# Patient Record
Sex: FEMALE | Race: WHITE | NOT HISPANIC OR LATINO | Employment: OTHER | URBAN - METROPOLITAN AREA
[De-identification: names, ages, dates, MRNs, and addresses within clinical notes are randomized per-mention and may not be internally consistent; named-entity substitution may affect disease eponyms.]

---

## 2022-12-14 ENCOUNTER — APPOINTMENT (OUTPATIENT)
Dept: GENERAL RADIOLOGY | Facility: HOSPITAL | Age: 87
End: 2022-12-14

## 2022-12-14 ENCOUNTER — HOSPITAL ENCOUNTER (OUTPATIENT)
Facility: HOSPITAL | Age: 87
Setting detail: OBSERVATION
Discharge: HOME | End: 2022-12-15
Attending: EMERGENCY MEDICINE | Admitting: INTERNAL MEDICINE

## 2022-12-14 DIAGNOSIS — R09.02 HYPOXIA: ICD-10-CM

## 2022-12-14 DIAGNOSIS — J18.9 MULTIFOCAL PNEUMONIA: Primary | ICD-10-CM

## 2022-12-14 DIAGNOSIS — J96.01 ACUTE RESPIRATORY FAILURE WITH HYPOXIA: ICD-10-CM

## 2022-12-14 PROBLEM — R65.10 SIRS (SYSTEMIC INFLAMMATORY RESPONSE SYNDROME): Status: ACTIVE | Noted: 2022-12-14

## 2022-12-14 LAB
ANION GAP SERPL CALCULATED.3IONS-SCNC: 13 MMOL/L (ref 5–15)
B PARAPERT DNA SPEC QL NAA+PROBE: NOT DETECTED
B PERT DNA SPEC QL NAA+PROBE: NOT DETECTED
BASOPHILS # BLD AUTO: 0 10*3/MM3 (ref 0–0.2)
BASOPHILS NFR BLD AUTO: 0.3 % (ref 0–1.5)
BUN SERPL-MCNC: 15 MG/DL (ref 8–23)
BUN/CREAT SERPL: 16.1 (ref 7–25)
C PNEUM DNA NPH QL NAA+NON-PROBE: NOT DETECTED
CALCIUM SPEC-SCNC: 9.6 MG/DL (ref 8.6–10.5)
CHLORIDE SERPL-SCNC: 102 MMOL/L (ref 98–107)
CO2 SERPL-SCNC: 24 MMOL/L (ref 22–29)
CREAT SERPL-MCNC: 0.93 MG/DL (ref 0.57–1)
D-LACTATE SERPL-SCNC: 0.3 MMOL/L (ref 0.5–2)
D-LACTATE SERPL-SCNC: 0.4 MMOL/L (ref 0.5–2)
DEPRECATED RDW RBC AUTO: 41.6 FL (ref 37–54)
EGFRCR SERPLBLD CKD-EPI 2021: 58.9 ML/MIN/1.73
EOSINOPHIL # BLD AUTO: 0 10*3/MM3 (ref 0–0.4)
EOSINOPHIL NFR BLD AUTO: 0.1 % (ref 0.3–6.2)
ERYTHROCYTE [DISTWIDTH] IN BLOOD BY AUTOMATED COUNT: 13.2 % (ref 12.3–15.4)
FLUAV SUBTYP SPEC NAA+PROBE: NOT DETECTED
FLUBV RNA ISLT QL NAA+PROBE: NOT DETECTED
GLUCOSE SERPL-MCNC: 106 MG/DL (ref 65–99)
HADV DNA SPEC NAA+PROBE: NOT DETECTED
HCOV 229E RNA SPEC QL NAA+PROBE: NOT DETECTED
HCOV HKU1 RNA SPEC QL NAA+PROBE: NOT DETECTED
HCOV NL63 RNA SPEC QL NAA+PROBE: NOT DETECTED
HCOV OC43 RNA SPEC QL NAA+PROBE: NOT DETECTED
HCT VFR BLD AUTO: 37.4 % (ref 34–46.6)
HGB BLD-MCNC: 12 G/DL (ref 12–15.9)
HMPV RNA NPH QL NAA+NON-PROBE: NOT DETECTED
HPIV1 RNA ISLT QL NAA+PROBE: NOT DETECTED
HPIV2 RNA SPEC QL NAA+PROBE: NOT DETECTED
HPIV3 RNA NPH QL NAA+PROBE: NOT DETECTED
HPIV4 P GENE NPH QL NAA+PROBE: NOT DETECTED
LYMPHOCYTES # BLD AUTO: 1.6 10*3/MM3 (ref 0.7–3.1)
LYMPHOCYTES NFR BLD AUTO: 10.5 % (ref 19.6–45.3)
M PNEUMO IGG SER IA-ACNC: NOT DETECTED
MCH RBC QN AUTO: 29.1 PG (ref 26.6–33)
MCHC RBC AUTO-ENTMCNC: 32 G/DL (ref 31.5–35.7)
MCV RBC AUTO: 91.1 FL (ref 79–97)
MONOCYTES # BLD AUTO: 1.3 10*3/MM3 (ref 0.1–0.9)
MONOCYTES NFR BLD AUTO: 8.8 % (ref 5–12)
NEUTROPHILS NFR BLD AUTO: 12 10*3/MM3 (ref 1.7–7)
NEUTROPHILS NFR BLD AUTO: 80.3 % (ref 42.7–76)
NRBC BLD AUTO-RTO: 0 /100 WBC (ref 0–0.2)
PLATELET # BLD AUTO: 317 10*3/MM3 (ref 140–450)
PMV BLD AUTO: 7.2 FL (ref 6–12)
POTASSIUM SERPL-SCNC: 3.9 MMOL/L (ref 3.5–5.2)
RBC # BLD AUTO: 4.11 10*6/MM3 (ref 3.77–5.28)
RHINOVIRUS RNA SPEC NAA+PROBE: NOT DETECTED
RSV RNA NPH QL NAA+NON-PROBE: NOT DETECTED
SARS-COV-2 RNA NPH QL NAA+NON-PROBE: NOT DETECTED
SODIUM SERPL-SCNC: 139 MMOL/L (ref 136–145)
WBC NRBC COR # BLD: 14.9 10*3/MM3 (ref 3.4–10.8)

## 2022-12-14 PROCEDURE — 93005 ELECTROCARDIOGRAM TRACING: CPT

## 2022-12-14 PROCEDURE — 84443 ASSAY THYROID STIM HORMONE: CPT | Performed by: NURSE PRACTITIONER

## 2022-12-14 PROCEDURE — 84145 PROCALCITONIN (PCT): CPT | Performed by: NURSE PRACTITIONER

## 2022-12-14 PROCEDURE — 86140 C-REACTIVE PROTEIN: CPT | Performed by: NURSE PRACTITIONER

## 2022-12-14 PROCEDURE — 80048 BASIC METABOLIC PNL TOTAL CA: CPT | Performed by: EMERGENCY MEDICINE

## 2022-12-14 PROCEDURE — 99284 EMERGENCY DEPT VISIT MOD MDM: CPT

## 2022-12-14 PROCEDURE — 71045 X-RAY EXAM CHEST 1 VIEW: CPT

## 2022-12-14 PROCEDURE — 83605 ASSAY OF LACTIC ACID: CPT

## 2022-12-14 PROCEDURE — 83735 ASSAY OF MAGNESIUM: CPT | Performed by: NURSE PRACTITIONER

## 2022-12-14 PROCEDURE — 80061 LIPID PANEL: CPT | Performed by: NURSE PRACTITIONER

## 2022-12-14 PROCEDURE — 87040 BLOOD CULTURE FOR BACTERIA: CPT | Performed by: EMERGENCY MEDICINE

## 2022-12-14 PROCEDURE — 0202U NFCT DS 22 TRGT SARS-COV-2: CPT | Performed by: EMERGENCY MEDICINE

## 2022-12-14 PROCEDURE — 93005 ELECTROCARDIOGRAM TRACING: CPT | Performed by: EMERGENCY MEDICINE

## 2022-12-14 PROCEDURE — 85025 COMPLETE CBC W/AUTO DIFF WBC: CPT | Performed by: EMERGENCY MEDICINE

## 2022-12-14 RX ORDER — IPRATROPIUM BROMIDE AND ALBUTEROL SULFATE 2.5; .5 MG/3ML; MG/3ML
3 SOLUTION RESPIRATORY (INHALATION) EVERY 6 HOURS PRN
Status: DISCONTINUED | OUTPATIENT
Start: 2022-12-14 | End: 2022-12-16 | Stop reason: HOSPADM

## 2022-12-14 RX ORDER — ONDANSETRON 2 MG/ML
4 INJECTION INTRAMUSCULAR; INTRAVENOUS EVERY 6 HOURS PRN
Status: DISCONTINUED | OUTPATIENT
Start: 2022-12-14 | End: 2022-12-16 | Stop reason: HOSPADM

## 2022-12-14 RX ORDER — ACETAMINOPHEN 325 MG/1
650 TABLET ORAL EVERY 4 HOURS PRN
Status: DISCONTINUED | OUTPATIENT
Start: 2022-12-14 | End: 2022-12-16 | Stop reason: HOSPADM

## 2022-12-14 RX ORDER — METHYLPREDNISOLONE SODIUM SUCCINATE 40 MG/ML
40 INJECTION, POWDER, LYOPHILIZED, FOR SOLUTION INTRAMUSCULAR; INTRAVENOUS DAILY
Status: DISCONTINUED | OUTPATIENT
Start: 2022-12-15 | End: 2022-12-15

## 2022-12-14 RX ORDER — AMOXICILLIN 250 MG
1 CAPSULE ORAL NIGHTLY PRN
Status: DISCONTINUED | OUTPATIENT
Start: 2022-12-14 | End: 2022-12-16 | Stop reason: HOSPADM

## 2022-12-14 RX ORDER — SODIUM CHLORIDE 0.9 % (FLUSH) 0.9 %
10 SYRINGE (ML) INJECTION AS NEEDED
Status: DISCONTINUED | OUTPATIENT
Start: 2022-12-14 | End: 2022-12-16 | Stop reason: HOSPADM

## 2022-12-14 RX ORDER — CHOLECALCIFEROL (VITAMIN D3) 125 MCG
5 CAPSULE ORAL NIGHTLY PRN
Status: DISCONTINUED | OUTPATIENT
Start: 2022-12-14 | End: 2022-12-16 | Stop reason: HOSPADM

## 2022-12-14 RX ORDER — POTASSIUM CHLORIDE 20 MEQ/1
40 TABLET, EXTENDED RELEASE ORAL AS NEEDED
Status: DISCONTINUED | OUTPATIENT
Start: 2022-12-14 | End: 2022-12-16 | Stop reason: HOSPADM

## 2022-12-14 RX ORDER — POTASSIUM CHLORIDE 1.5 G/1.77G
40 POWDER, FOR SOLUTION ORAL AS NEEDED
Status: DISCONTINUED | OUTPATIENT
Start: 2022-12-14 | End: 2022-12-16 | Stop reason: HOSPADM

## 2022-12-14 RX ORDER — MAGNESIUM SULFATE 1 G/100ML
1 INJECTION INTRAVENOUS AS NEEDED
Status: DISCONTINUED | OUTPATIENT
Start: 2022-12-14 | End: 2022-12-16 | Stop reason: HOSPADM

## 2022-12-14 RX ORDER — SODIUM CHLORIDE 0.9 % (FLUSH) 0.9 %
10 SYRINGE (ML) INJECTION EVERY 12 HOURS SCHEDULED
Status: DISCONTINUED | OUTPATIENT
Start: 2022-12-14 | End: 2022-12-16 | Stop reason: HOSPADM

## 2022-12-14 RX ORDER — ONDANSETRON 4 MG/1
4 TABLET, FILM COATED ORAL EVERY 6 HOURS PRN
Status: DISCONTINUED | OUTPATIENT
Start: 2022-12-14 | End: 2022-12-16 | Stop reason: HOSPADM

## 2022-12-14 RX ORDER — ACETAMINOPHEN 650 MG/1
650 SUPPOSITORY RECTAL EVERY 4 HOURS PRN
Status: DISCONTINUED | OUTPATIENT
Start: 2022-12-14 | End: 2022-12-16 | Stop reason: HOSPADM

## 2022-12-14 RX ORDER — BENZONATATE 100 MG/1
100 CAPSULE ORAL 3 TIMES DAILY PRN
Status: DISCONTINUED | OUTPATIENT
Start: 2022-12-14 | End: 2022-12-16 | Stop reason: HOSPADM

## 2022-12-14 RX ORDER — GUAIFENESIN 600 MG/1
600 TABLET, EXTENDED RELEASE ORAL EVERY 12 HOURS SCHEDULED
Status: DISCONTINUED | OUTPATIENT
Start: 2022-12-14 | End: 2022-12-16 | Stop reason: HOSPADM

## 2022-12-14 RX ORDER — MAGNESIUM SULFATE HEPTAHYDRATE 40 MG/ML
2 INJECTION, SOLUTION INTRAVENOUS AS NEEDED
Status: DISCONTINUED | OUTPATIENT
Start: 2022-12-14 | End: 2022-12-16 | Stop reason: HOSPADM

## 2022-12-14 RX ORDER — NITROGLYCERIN 0.4 MG/1
0.4 TABLET SUBLINGUAL
Status: DISCONTINUED | OUTPATIENT
Start: 2022-12-14 | End: 2022-12-16 | Stop reason: HOSPADM

## 2022-12-14 RX ORDER — ACETAMINOPHEN 160 MG/5ML
650 SOLUTION ORAL EVERY 4 HOURS PRN
Status: DISCONTINUED | OUTPATIENT
Start: 2022-12-14 | End: 2022-12-16 | Stop reason: HOSPADM

## 2022-12-14 RX ORDER — SODIUM CHLORIDE 9 MG/ML
40 INJECTION, SOLUTION INTRAVENOUS AS NEEDED
Status: DISCONTINUED | OUTPATIENT
Start: 2022-12-14 | End: 2022-12-16 | Stop reason: HOSPADM

## 2022-12-14 RX ORDER — CALCIUM CARBONATE 200(500)MG
2 TABLET,CHEWABLE ORAL 2 TIMES DAILY PRN
Status: DISCONTINUED | OUTPATIENT
Start: 2022-12-14 | End: 2022-12-16 | Stop reason: HOSPADM

## 2022-12-15 ENCOUNTER — APPOINTMENT (OUTPATIENT)
Dept: CT IMAGING | Facility: HOSPITAL | Age: 87
End: 2022-12-15

## 2022-12-15 VITALS
BODY MASS INDEX: 27.96 KG/M2 | TEMPERATURE: 98.6 F | DIASTOLIC BLOOD PRESSURE: 47 MMHG | HEART RATE: 63 BPM | OXYGEN SATURATION: 93 % | HEIGHT: 59 IN | RESPIRATION RATE: 14 BRPM | SYSTOLIC BLOOD PRESSURE: 118 MMHG | WEIGHT: 138.67 LBS

## 2022-12-15 PROBLEM — G47.00 INSOMNIA: Status: ACTIVE | Noted: 2022-12-15

## 2022-12-15 PROBLEM — J96.01 ACUTE RESPIRATORY FAILURE WITH HYPOXIA: Status: ACTIVE | Noted: 2022-12-15

## 2022-12-15 LAB
ANION GAP SERPL CALCULATED.3IONS-SCNC: 8 MMOL/L (ref 5–15)
BASOPHILS # BLD AUTO: 0 10*3/MM3 (ref 0–0.2)
BASOPHILS NFR BLD AUTO: 0.2 % (ref 0–1.5)
BUN SERPL-MCNC: 16 MG/DL (ref 8–23)
BUN/CREAT SERPL: 16.5 (ref 7–25)
CALCIUM SPEC-SCNC: 9.8 MG/DL (ref 8.6–10.5)
CHLORIDE SERPL-SCNC: 105 MMOL/L (ref 98–107)
CHOLEST SERPL-MCNC: 203 MG/DL (ref 0–200)
CO2 SERPL-SCNC: 25 MMOL/L (ref 22–29)
CREAT SERPL-MCNC: 0.97 MG/DL (ref 0.57–1)
CRP SERPL-MCNC: 14.49 MG/DL (ref 0–0.5)
DEPRECATED RDW RBC AUTO: 42.9 FL (ref 37–54)
EGFRCR SERPLBLD CKD-EPI 2021: 56 ML/MIN/1.73
EOSINOPHIL # BLD AUTO: 0 10*3/MM3 (ref 0–0.4)
EOSINOPHIL NFR BLD AUTO: 0 % (ref 0.3–6.2)
ERYTHROCYTE [DISTWIDTH] IN BLOOD BY AUTOMATED COUNT: 12.9 % (ref 12.3–15.4)
GLUCOSE SERPL-MCNC: 155 MG/DL (ref 65–99)
HBA1C MFR BLD: 4.7 % (ref 3.5–5.6)
HCT VFR BLD AUTO: 35.6 % (ref 34–46.6)
HDLC SERPL-MCNC: 69 MG/DL (ref 40–60)
HGB BLD-MCNC: 12 G/DL (ref 12–15.9)
LDLC SERPL CALC-MCNC: 121 MG/DL (ref 0–100)
LDLC/HDLC SERPL: 1.73 {RATIO}
LYMPHOCYTES # BLD AUTO: 0.9 10*3/MM3 (ref 0.7–3.1)
LYMPHOCYTES NFR BLD AUTO: 6 % (ref 19.6–45.3)
MAGNESIUM SERPL-MCNC: 2 MG/DL (ref 1.6–2.4)
MAGNESIUM SERPL-MCNC: 2.2 MG/DL (ref 1.6–2.4)
MCH RBC QN AUTO: 30.2 PG (ref 26.6–33)
MCHC RBC AUTO-ENTMCNC: 33.7 G/DL (ref 31.5–35.7)
MCV RBC AUTO: 89.5 FL (ref 79–97)
MONOCYTES # BLD AUTO: 0.5 10*3/MM3 (ref 0.1–0.9)
MONOCYTES NFR BLD AUTO: 3.2 % (ref 5–12)
NEUTROPHILS NFR BLD AUTO: 13 10*3/MM3 (ref 1.7–7)
NEUTROPHILS NFR BLD AUTO: 90.6 % (ref 42.7–76)
NRBC BLD AUTO-RTO: 0 /100 WBC (ref 0–0.2)
PLATELET # BLD AUTO: 292 10*3/MM3 (ref 140–450)
PMV BLD AUTO: 7.3 FL (ref 6–12)
POTASSIUM SERPL-SCNC: 5 MMOL/L (ref 3.5–5.2)
PROCALCITONIN SERPL-MCNC: 0.1 NG/ML (ref 0–0.25)
RBC # BLD AUTO: 3.98 10*6/MM3 (ref 3.77–5.28)
SODIUM SERPL-SCNC: 138 MMOL/L (ref 136–145)
TRIGL SERPL-MCNC: 74 MG/DL (ref 0–150)
TSH SERPL DL<=0.05 MIU/L-ACNC: 1.06 UIU/ML (ref 0.27–4.2)
VLDLC SERPL-MCNC: 13 MG/DL (ref 5–40)
WBC NRBC COR # BLD: 14.4 10*3/MM3 (ref 3.4–10.8)

## 2022-12-15 PROCEDURE — 94618 PULMONARY STRESS TESTING: CPT

## 2022-12-15 PROCEDURE — 83735 ASSAY OF MAGNESIUM: CPT | Performed by: NURSE PRACTITIONER

## 2022-12-15 PROCEDURE — 96365 THER/PROPH/DIAG IV INF INIT: CPT

## 2022-12-15 PROCEDURE — G0378 HOSPITAL OBSERVATION PER HR: HCPCS

## 2022-12-15 PROCEDURE — 94799 UNLISTED PULMONARY SVC/PX: CPT

## 2022-12-15 PROCEDURE — 83036 HEMOGLOBIN GLYCOSYLATED A1C: CPT | Performed by: NURSE PRACTITIONER

## 2022-12-15 PROCEDURE — 94640 AIRWAY INHALATION TREATMENT: CPT

## 2022-12-15 PROCEDURE — 36415 COLL VENOUS BLD VENIPUNCTURE: CPT | Performed by: NURSE PRACTITIONER

## 2022-12-15 PROCEDURE — 80048 BASIC METABOLIC PNL TOTAL CA: CPT | Performed by: NURSE PRACTITIONER

## 2022-12-15 PROCEDURE — 94664 DEMO&/EVAL PT USE INHALER: CPT

## 2022-12-15 PROCEDURE — 94761 N-INVAS EAR/PLS OXIMETRY MLT: CPT

## 2022-12-15 PROCEDURE — 96367 TX/PROPH/DG ADDL SEQ IV INF: CPT

## 2022-12-15 PROCEDURE — 96375 TX/PRO/DX INJ NEW DRUG ADDON: CPT

## 2022-12-15 PROCEDURE — 85025 COMPLETE CBC W/AUTO DIFF WBC: CPT | Performed by: NURSE PRACTITIONER

## 2022-12-15 PROCEDURE — 71250 CT THORAX DX C-: CPT

## 2022-12-15 PROCEDURE — 25010000002 CEFTRIAXONE PER 250 MG: Performed by: EMERGENCY MEDICINE

## 2022-12-15 PROCEDURE — 25010000002 METHYLPREDNISOLONE PER 40 MG: Performed by: NURSE PRACTITIONER

## 2022-12-15 RX ORDER — BENZONATATE 100 MG/1
100 CAPSULE ORAL 3 TIMES DAILY PRN
Qty: 30 CAPSULE | Refills: 0 | Status: SHIPPED | OUTPATIENT
Start: 2022-12-15 | End: 2022-12-25

## 2022-12-15 RX ORDER — PANTOPRAZOLE SODIUM 40 MG/1
40 TABLET, DELAYED RELEASE ORAL DAILY
Status: DISCONTINUED | OUTPATIENT
Start: 2022-12-15 | End: 2022-12-16 | Stop reason: HOSPADM

## 2022-12-15 RX ORDER — PREDNISONE 10 MG/1
30 TABLET ORAL DAILY
Qty: 6 TABLET | Refills: 0 | Status: SHIPPED | OUTPATIENT
Start: 2022-12-16 | End: 2022-12-15

## 2022-12-15 RX ORDER — DOXYCYCLINE 100 MG/1
100 TABLET ORAL EVERY 12 HOURS SCHEDULED
Qty: 13 TABLET | Refills: 0 | Status: SHIPPED | OUTPATIENT
Start: 2022-12-15 | End: 2022-12-22

## 2022-12-15 RX ORDER — TRAMADOL HYDROCHLORIDE 50 MG/1
50 TABLET ORAL EVERY 6 HOURS PRN
Status: DISCONTINUED | OUTPATIENT
Start: 2022-12-15 | End: 2022-12-16 | Stop reason: HOSPADM

## 2022-12-15 RX ORDER — PREDNISONE 10 MG/1
10 TABLET ORAL DAILY
Qty: 12 TABLET | Refills: 0 | Status: SHIPPED | OUTPATIENT
Start: 2022-12-20 | End: 2022-12-22

## 2022-12-15 RX ORDER — PANTOPRAZOLE SODIUM 40 MG/1
40 TABLET, DELAYED RELEASE ORAL DAILY
COMMUNITY

## 2022-12-15 RX ORDER — CEFDINIR 300 MG/1
300 CAPSULE ORAL EVERY 12 HOURS SCHEDULED
Qty: 13 CAPSULE | Refills: 0 | Status: SHIPPED | OUTPATIENT
Start: 2022-12-15 | End: 2022-12-15 | Stop reason: SDUPTHER

## 2022-12-15 RX ORDER — DOXYCYCLINE 100 MG/1
100 TABLET ORAL EVERY 12 HOURS SCHEDULED
Status: DISCONTINUED | OUTPATIENT
Start: 2022-12-15 | End: 2022-12-16 | Stop reason: HOSPADM

## 2022-12-15 RX ORDER — CEFDINIR 300 MG/1
300 CAPSULE ORAL EVERY 12 HOURS SCHEDULED
Status: DISCONTINUED | OUTPATIENT
Start: 2022-12-15 | End: 2022-12-16 | Stop reason: HOSPADM

## 2022-12-15 RX ORDER — ALBUTEROL SULFATE 90 UG/1
2 AEROSOL, METERED RESPIRATORY (INHALATION) EVERY 4 HOURS PRN
Qty: 8.5 G | Refills: 2 | Status: SHIPPED | OUTPATIENT
Start: 2022-12-15 | End: 2023-03-15

## 2022-12-15 RX ORDER — VALSARTAN 40 MG/1
40 TABLET ORAL DAILY
Status: DISCONTINUED | OUTPATIENT
Start: 2022-12-15 | End: 2022-12-16 | Stop reason: HOSPADM

## 2022-12-15 RX ORDER — CEFDINIR 300 MG/1
300 CAPSULE ORAL EVERY 12 HOURS SCHEDULED
Qty: 13 CAPSULE | Refills: 0 | Status: SHIPPED | OUTPATIENT
Start: 2022-12-15 | End: 2022-12-22

## 2022-12-15 RX ORDER — GUAIFENESIN 600 MG/1
600 TABLET, EXTENDED RELEASE ORAL EVERY 12 HOURS SCHEDULED
Qty: 20 TABLET | Refills: 0 | Status: SHIPPED | OUTPATIENT
Start: 2022-12-15 | End: 2022-12-25

## 2022-12-15 RX ORDER — ALBUTEROL SULFATE 90 UG/1
2 AEROSOL, METERED RESPIRATORY (INHALATION) EVERY 4 HOURS PRN
Qty: 8.5 G | Refills: 2 | Status: SHIPPED | OUTPATIENT
Start: 2022-12-15 | End: 2022-12-15 | Stop reason: SDUPTHER

## 2022-12-15 RX ORDER — PREDNISONE 10 MG/1
10 TABLET ORAL DAILY
Qty: 12 TABLET | Refills: 0 | Status: SHIPPED | OUTPATIENT
Start: 2022-12-20 | End: 2022-12-15 | Stop reason: SDUPTHER

## 2022-12-15 RX ORDER — PREDNISONE 20 MG/1
20 TABLET ORAL DAILY
Qty: 2 TABLET | Refills: 0 | Status: SHIPPED | OUTPATIENT
Start: 2022-12-18 | End: 2022-12-15

## 2022-12-15 RX ORDER — AMLODIPINE BESYLATE 5 MG/1
5 TABLET ORAL DAILY
COMMUNITY

## 2022-12-15 RX ORDER — PREDNISONE 20 MG/1
20 TABLET ORAL DAILY
Status: DISCONTINUED | OUTPATIENT
Start: 2022-12-18 | End: 2022-12-16 | Stop reason: HOSPADM

## 2022-12-15 RX ORDER — DIAZEPAM 2 MG/1
2 TABLET ORAL NIGHTLY PRN
Status: DISCONTINUED | OUTPATIENT
Start: 2022-12-15 | End: 2022-12-16 | Stop reason: HOSPADM

## 2022-12-15 RX ORDER — BENZONATATE 100 MG/1
100 CAPSULE ORAL 3 TIMES DAILY PRN
Qty: 30 CAPSULE | Refills: 0 | Status: SHIPPED | OUTPATIENT
Start: 2022-12-15 | End: 2022-12-15 | Stop reason: SDUPTHER

## 2022-12-15 RX ORDER — GUAIFENESIN 600 MG/1
600 TABLET, EXTENDED RELEASE ORAL EVERY 12 HOURS SCHEDULED
Qty: 20 TABLET | Refills: 0 | Status: SHIPPED | OUTPATIENT
Start: 2022-12-15 | End: 2022-12-15 | Stop reason: SDUPTHER

## 2022-12-15 RX ORDER — AMLODIPINE BESYLATE 5 MG/1
5 TABLET ORAL DAILY
Status: DISCONTINUED | OUTPATIENT
Start: 2022-12-15 | End: 2022-12-16 | Stop reason: HOSPADM

## 2022-12-15 RX ORDER — VALSARTAN 80 MG/1
40 TABLET ORAL DAILY
COMMUNITY

## 2022-12-15 RX ORDER — DOXYCYCLINE 100 MG/1
100 TABLET ORAL EVERY 12 HOURS SCHEDULED
Qty: 13 TABLET | Refills: 0 | Status: SHIPPED | OUTPATIENT
Start: 2022-12-15 | End: 2022-12-15 | Stop reason: SDUPTHER

## 2022-12-15 RX ORDER — PREDNISONE 10 MG/1
10 TABLET ORAL DAILY
Status: DISCONTINUED | OUTPATIENT
Start: 2022-12-20 | End: 2022-12-16 | Stop reason: HOSPADM

## 2022-12-15 RX ADMIN — Medication 10 ML: at 00:15

## 2022-12-15 RX ADMIN — METHYLPREDNISOLONE SODIUM SUCCINATE 40 MG: 40 INJECTION, POWDER, FOR SOLUTION INTRAMUSCULAR; INTRAVENOUS at 01:40

## 2022-12-15 RX ADMIN — IPRATROPIUM BROMIDE 2 PUFF: 17 AEROSOL, METERED RESPIRATORY (INHALATION) at 15:37

## 2022-12-15 RX ADMIN — Medication 10 ML: at 08:34

## 2022-12-15 RX ADMIN — BENZONATATE 100 MG: 100 CAPSULE ORAL at 00:40

## 2022-12-15 RX ADMIN — ACETAMINOPHEN 650 MG: 325 TABLET, FILM COATED ORAL at 00:40

## 2022-12-15 RX ADMIN — IPRATROPIUM BROMIDE AND ALBUTEROL SULFATE 3 ML: .5; 3 SOLUTION RESPIRATORY (INHALATION) at 01:07

## 2022-12-15 RX ADMIN — CEFTRIAXONE 1 G: 1 INJECTION, POWDER, FOR SOLUTION INTRAMUSCULAR; INTRAVENOUS at 00:14

## 2022-12-15 RX ADMIN — GUAIFENESIN 600 MG: 600 TABLET, EXTENDED RELEASE ORAL at 08:34

## 2022-12-15 RX ADMIN — GUAIFENESIN 600 MG: 600 TABLET, EXTENDED RELEASE ORAL at 00:40

## 2022-12-15 RX ADMIN — DOXYCYCLINE 100 MG: 100 INJECTION, POWDER, LYOPHILIZED, FOR SOLUTION INTRAVENOUS at 01:01

## 2022-12-15 RX ADMIN — DOXYCYCLINE 100 MG: 100 TABLET ORAL at 13:27

## 2022-12-15 RX ADMIN — Medication 5 MG: at 00:40

## 2022-12-15 RX ADMIN — CEFDINIR 300 MG: 300 CAPSULE ORAL at 13:27

## 2022-12-15 RX ADMIN — IPRATROPIUM BROMIDE 2 PUFF: 17 AEROSOL, METERED RESPIRATORY (INHALATION) at 12:26

## 2022-12-15 NOTE — PLAN OF CARE
Goal Outcome Evaluation:      Nsg reports transfers unassisted, spoke with patient and and son, reports 24 hour care at d/c, denies therapy needs. Recommend 6MWT prior to d/c to determine O2 needs. Does not require skilled OT at this time.

## 2022-12-15 NOTE — PROGRESS NOTES
Exercise Oximetry    Patient Name:Kiki Dutta   MRN: [id number]   Date: 12/15/22             ROOM AIR BASELINE   SpO2% 91   Heart Rate 72   Blood Pressure      EXERCISE ON ROOM AIR SpO2% EXERCISE ON O2 @ 1 LPM SpO2%   1 MINUTE  90 1 MINUTE    2 MINUTES  87 2 MINUTES  91   3 MINUTES  3 MINUTES  90    4 MINUTES  4 MINUTES  92   5 MINUTES  5 MINUTES  93   6 MINUTES  6 MINUTES  93              Distance Walked   Distance Walked   Dyspnea (Viviana Scale)   Dyspnea (Viviana Scale)   Fatigue (Viviana Scale)   Fatigue (Viviana Scale)   SpO2% Post Exercise  92 SpO2% Post Exercise   HR Post Exercise  71 HR Post Exercise   Time to Recovery  15 minutes Time to Recovery     Comments: PT sat 91% on room air while at rest. PT walked for 6 minutes and needed 1L oxygen.  PT qualifies for 1L of home oxygen

## 2022-12-15 NOTE — PAYOR COMM NOTE
"Clinical Support - Claim # [id number]  Please advise if additional clinical is needed to process this request.  Thank you!    Melonie Hanna  Utilization Review Coordinator  [address]  [address]  Ph: [phone]  Fx: [phone]        Kiki Mathews (89 y.o. Female)     Date of Birth   10/02/1933    Social Security Number       Address   [address] [place] [address] [address] [place] [address]    Home Phone   [phone]    MRN   [id number]       Gnosticism   Jew    Marital Status                               Admission Date   22    Admission Type   Emergency    Admitting Provider   Jorge Mcdaniel MD    Attending Provider   Jorge Mcdaniel MD    Department, Room/Bed   [facility] EMERGENCY DEPARTMENT,        Discharge Date       Discharge Disposition       Discharge Destination                               Attending Provider: Jorge Mcdaniel MD    Allergies: No Known Allergies    Isolation: None   Infection: None   Code Status: No CPR    Ht: 149.9 cm (59\")   Wt: 62.9 kg (138 lb 10.7 oz)    Admission Cmt: None   Principal Problem: Multifocal pneumonia [J18.9]                 Active Insurance as of 2022     Primary Coverage     Payor Plan Insurance Group Employer/Plan Group    MISC COMMERCIAL MISC COMMERCIAL NGN     Coverage Address Coverage Phone Number Coverage Fax Number Effective Dates    535 ELSY  [phone]  10/1/2022 - None Entered    [facility] [id number]       Subscriber Name Subscriber Birth Date Member ID       KIKI MATHEWS [birth year] [id number]                 Emergency Contacts      (Rel.) Home Phone Work Phone Mobile Phone    CYNTHIA MATHEWS (Son) [phone] -- [phone]               History & Physical      Alsop, CAROL Smith at 12/15/22 0016              [facility] Medicine Services  History & Physical    Patient Name: Kiki Mathews  : 10/2/1933  MRN: " [id number]  Primary Care Physician:  Provider, No Known  Date of admission: 12/14/2022  Date and Time of Service: 12/15/2022 at 0006    Subjective       Chief Complaint: Shortness of breath    History of Present Illness: Kiki Dutta is a 89 y.o. female with past medical history of a lung condition, chronic pain, insomnia who presented to [facility] on 12/14/2022 complaining of shortness of breath, fever that started 1 to 2 weeks ago.  Patient also complains of productive cough of yellow sputum, body aches, fatigue, generalized weakness.  She denies nausea, vomiting, diarrhea, chest pain, chills.  A family member she has been in contact with had similar symptoms and was diagnosed with the flu approximately 1 week ago.  Patient stated she has home inhalers for a lung condition, but she is unsure of the diagnosis.  Patient does not wear supplemental oxygen at home.  She is currently on 3 L per NC.    In the ED, chest x-ray showed patchy ill-defined infiltrates bilaterally with associated interstitial changes, suggestive of developing atypical/viral infection or multifocal pneumonia, COVID-19 may be considered in the differential.  EKG showed sinus rhythm.  All labs unremarkable except WBC 14.9.  All vital signs unremarkable except O2 sat 88% on room air, temp 100.6.  Patient received Rocephin, doxycycline in the ED.  Patient admitted to hospitalist service for further evaluation and treatment.    Review of Systems   Constitutional: Positive for fever and malaise/fatigue. Negative for chills.   HENT: Negative.    Eyes: Negative.    Cardiovascular: Positive for dyspnea on exertion. Negative for chest pain.   Respiratory: Positive for cough and shortness of breath.    Endocrine: Negative.    Skin: Negative.    Musculoskeletal: Positive for myalgias.   Gastrointestinal: Negative.  Negative for nausea.   Genitourinary: Negative.    Neurological: Positive for weakness.   Psychiatric/Behavioral: Negative.     Allergic/Immunologic: Negative.         Personal History     History reviewed. No pertinent past medical history.    History reviewed. No pertinent surgical history.    Family History: family history is not on file. Otherwise pertinent FHx was reviewed and not pertinent to current issue.    Social History:  reports that she has quit smoking. Her smoking use included cigarettes. She does not have any smokeless tobacco history on file. She reports current alcohol use. She reports that she does not use drugs.    Home Medications:  Prior to Admission Medications     None            Allergies:  No Known Allergies    Objective       Vitals:   Temp:  [100.6 °F (38.1 °C)] 100.6 °F (38.1 °C)  Heart Rate:  [85-93] 87  Resp:  [18-25] 18  BP: (117-160)/(41-67) 149/66    Physical Exam  Vitals and nursing note reviewed.   Constitutional:       Appearance: Normal appearance.   HENT:      Head: Normocephalic.      Right Ear: External ear normal.      Left Ear: External ear normal.      Nose: Nose normal.      Mouth/Throat:      Pharynx: Oropharynx is clear.   Eyes:      Extraocular Movements: Extraocular movements intact.   Cardiovascular:      Rate and Rhythm: Normal rate and regular rhythm.      Pulses: Normal pulses.      Heart sounds: Normal heart sounds.   Pulmonary:      Comments: Accessory muscle use noted, lung sounds diminished throughout  Abdominal:      General: Bowel sounds are normal.      Palpations: Abdomen is soft.   Musculoskeletal:         General: Normal range of motion.      Cervical back: Normal range of motion.   Skin:     General: Skin is warm and dry.   Neurological:      Mental Status: She is alert and oriented to person, place, and time.   Psychiatric:         Mood and Affect: Mood normal.         Behavior: Behavior normal.         Result Review    Result Review:  I have personally reviewed the results from the time of this admission to 12/15/2022 01:16 EST and agree with these findings:  [x]   Laboratory  [x]  Microbiology  [x]  Radiology  [x]  EKG/Telemetry   []  Cardiology/Vascular   []  Pathology  []  Old records  []  Other:  Most notable findings include: as above      Assessment & Plan        Active Hospital Problems:  Active Hospital Problems    Diagnosis    • **Multifocal pneumonia    • Insomnia    • SIRS (systemic inflammatory response syndrome) (HCC)      Plan:     Multifocal pneumonia  SIRS (systemic inflammatory response syndrome)   -chest x-ray showed patchy ill-defined infiltrates bilaterally with associated interstitial changes, suggestive of developing atypical/viral infection or multifocal pneumonia, COVID-19 may be considered in the differential  -EKG showed sinus rhythm  -Respiratory panel negative  -WBC 14.9  -Lactate WNL  -Temperature 100.6  -O2 sat 88% on room air  -Currently on 3 L per [place] supplemental oxygen  -Wean off supplemental oxygen  -Rocephin and doxycycline given in the ED, continue  -DuoNeb, methylprednisolone, Mucinex, Tessalon ordered    Insomnia     DVT prophylaxis:  Mechanical DVT prophylaxis orders are present.    CODE STATUS:    Level Of Support Discussed With: Patient  Code Status (Patient has no pulse and is not breathing): No CPR (Do Not Attempt to Resuscitate)  Medical Interventions (Patient has pulse or is breathing): Full Support    Admission Status:  I believe this patient meets inpatient status.    I discussed the patient's findings and my recommendations with patient and family.    This patient has been examined wearing appropriate Personal Protective Equipment. 12/15/22      Signature: Electronically signed by CAROL Roy, 12/15/22, 01:16 [facility].  [facility] Hospitalist Team    Electronically signed by Carmella Fuller APRN at 12/15/22 0130          Emergency Department Notes      Rickie Galloway MD at 12/14/22 2051          Subjective   History of Present Illness  Chief complaint: Shortness of breath    89-year-old female presents with cough,  "shortness of breath, fever.  Cough is productive.  Patient states symptoms have been present for 1 to 2 weeks.  She has had family with similar symptoms.  She denies any vomiting or diarrhea.  She has had body aches but no specific chest pain.  She denies any alleviating or exacerbating factors.  Symptoms described as moderate in intensity.    History provided by:  Patient      Review of Systems   Constitutional: Positive for fever.   HENT: Positive for congestion.    Eyes: Negative for redness.   Respiratory: Positive for cough and shortness of breath.    Cardiovascular: Negative for chest pain.   Gastrointestinal: Negative for abdominal pain, diarrhea and vomiting.   Genitourinary: Negative for dysuria.   Musculoskeletal: Negative for back pain.   Skin: Negative for rash.   Neurological: Negative for headaches.   Psychiatric/Behavioral: Negative for confusion.       No past medical history on file.    No Known Allergies    No past surgical history on file.    No family history on file.    Social History     Socioeconomic History   • Marital status:        /41   Pulse 86   Temp (!) 100.6 °F (38.1 °C)   Resp 20   Ht 149.9 cm (59\")   Wt 62.9 kg (138 lb 10.7 oz)   SpO2 94%   BMI 28.01 kg/m²       Objective   Physical Exam  Vitals and nursing note reviewed.   Constitutional:       Appearance: She is well-developed.   HENT:      Head: Normocephalic and atraumatic.   Eyes:      Pupils: Pupils are equal, round, and reactive to light.   Cardiovascular:      Rate and Rhythm: Normal rate and regular rhythm.      Heart sounds: Normal heart sounds.   Pulmonary:      Effort: Pulmonary effort is normal. No respiratory distress.      Breath sounds: Decreased breath sounds and rhonchi present.      Comments: Frequent coughing  Abdominal:      General: Bowel sounds are normal.      Palpations: Abdomen is soft.      Tenderness: There is no abdominal tenderness.   Musculoskeletal:         General: Normal range of " motion.      Cervical back: Normal range of motion and neck supple.   Skin:     General: Skin is warm and dry.   Neurological:      General: No focal deficit present.      Mental Status: She is alert and oriented to person, place, and time.         Procedures          ED Course      My interpretation of EKG shows sinus rhythm, rate of 89, no ST elevation     Results for orders placed or performed during the hospital encounter of 12/14/22   Respiratory Panel PCR w/COVID-19(SARS-CoV-2) NANDA/ALISHA/KOJO/PAD/COR/MAD/JUSTIN In-House, NP Swab in UTM/VTM, 3-4 HR TAT - Swab, Nasopharynx    Specimen: Nasopharynx; Swab   Result Value Ref Range    ADENOVIRUS, PCR Not Detected Not Detected    Coronavirus 229E Not Detected Not Detected    Coronavirus HKU1 Not Detected Not Detected    Coronavirus NL63 Not Detected Not Detected    Coronavirus OC43 Not Detected Not Detected    COVID19 Not Detected Not Detected - Ref. Range    Human Metapneumovirus Not Detected Not Detected    Human Rhinovirus/Enterovirus Not Detected Not Detected    Influenza A PCR Not Detected Not Detected    Influenza B PCR Not Detected Not Detected    Parainfluenza Virus 1 Not Detected Not Detected    Parainfluenza Virus 2 Not Detected Not Detected    Parainfluenza Virus 3 Not Detected Not Detected    Parainfluenza Virus 4 Not Detected Not Detected    RSV, PCR Not Detected Not Detected    Bordetella pertussis pcr Not Detected Not Detected    Bordetella parapertussis PCR Not Detected Not Detected    Chlamydophila pneumoniae PCR Not Detected Not Detected    Mycoplasma pneumo by PCR Not Detected Not Detected   Basic Metabolic Panel    Specimen: Arm, Left; Blood   Result Value Ref Range    Glucose 106 (H) 65 - 99 mg/dL    BUN 15 8 - 23 mg/dL    Creatinine 0.93 0.57 - 1.00 mg/dL    Sodium 139 136 - 145 mmol/L    Potassium 3.9 3.5 - 5.2 mmol/L    Chloride 102 98 - 107 mmol/L    CO2 24.0 22.0 - 29.0 mmol/L    Calcium 9.6 8.6 - 10.5 mg/dL    BUN/Creatinine Ratio 16.1 7.0 - 25.0     Anion Gap 13.0 5.0 - 15.0 mmol/L    eGFR 58.9 (L) >60.0 mL/min/1.73   CBC Auto Differential    Specimen: Arm, Left; Blood   Result Value Ref Range    WBC 14.90 (H) 3.40 - 10.80 10*3/mm3    RBC 4.11 3.77 - 5.28 10*6/mm3    Hemoglobin 12.0 12.0 - 15.9 g/dL    Hematocrit 37.4 34.0 - 46.6 %    MCV 91.1 79.0 - 97.0 fL    MCH 29.1 26.6 - 33.0 pg    MCHC 32.0 31.5 - 35.7 g/dL    RDW 13.2 12.3 - 15.4 %    RDW-SD 41.6 37.0 - 54.0 fl    MPV 7.2 6.0 - 12.0 fL    Platelets 317 140 - 450 10*3/mm3    Neutrophil % 80.3 (H) 42.7 - 76.0 %    Lymphocyte % 10.5 (L) 19.6 - 45.3 %    Monocyte % 8.8 5.0 - 12.0 %    Eosinophil % 0.1 (L) 0.3 - 6.2 %    Basophil % 0.3 0.0 - 1.5 %    Neutrophils, Absolute 12.00 (H) 1.70 - 7.00 10*3/mm3    Lymphocytes, Absolute 1.60 0.70 - 3.10 10*3/mm3    Monocytes, Absolute 1.30 (H) 0.10 - 0.90 10*3/mm3    Eosinophils, Absolute 0.00 0.00 - 0.40 10*3/mm3    Basophils, Absolute 0.00 0.00 - 0.20 10*3/mm3    nRBC 0.0 0.0 - 0.2 /100 WBC   POC Lactate    Specimen: Blood   Result Value Ref Range    Lactate 0.3 (L) 0.5 - 2.0 mmol/L   POC Lactate    Specimen: Blood   Result Value Ref Range    Lactate 0.4 (L) 0.5 - 2.0 mmol/L   ECG 12 Lead Dyspnea   Result Value Ref Range    QT Interval 335 ms     XR Chest 1 View    Result Date: 12/14/2022  Patchy ill-defined infiltrates bilaterally with associated interstitial changes. The findings suggest developing atypical/viral infection or multifocal pneumonia. Covid 19 may be considered in the differential. Recommend correlation for signs or symptoms of acute infection and follow-up to ensure improvement/resolution.  Electronically Signed By-Seth De La Vega MD On:12/14/2022 9:26 PM This report was finalized on [id number] by  Seth De La Vega MD.                                    MDM   Patient had the above evaluation.  Results were discussed with the patient.  Patient was mildly hypoxic on arrival at around 88% on room air.  She is placed on nasal cannula.  White blood  cell count was 14.9.  Lactic acid is normal.  Respiratory panel is negative.  BMP is unremarkable.  Chest x-ray is showing multifocal pneumonia.  She was started on IV antibiotics.  Blood cultures were obtained.  I discussed with the nurse practitioner on-call for the hospitalist and the patient will be admitted for further evaluation and management.      Final diagnoses:   Multifocal pneumonia   Hypoxia       ED Disposition  ED Disposition     ED Disposition   Decision to Admit    Condition   --    Comment   Level of Care: Telemetry [5]   Diagnosis: Multifocal pneumonia [7085635]   Admitting Physician: ROSELYN MUNOZ [[id number]]   Attending Physician: ROSELYN MUNOZ [[id number]]   Certification: I Certify That Inpatient Hospital Services Are Medically Necessary For Greater Than 2 Midnights               No follow-up provider specified.       Medication List      No changes were made to your prescriptions during this visit.          Rickie Galloway MD  12/15/22 0000      Electronically signed by Rickie Galloway MD at 12/15/22 0000     Danielle Liu RN at 12/14/22 2131        Pt c/o cough, soa and fever x 2 weeks, worse today, exposed to influenza.     Electronically signed by Danielle Liu RN at 12/14/22 9135       Vital Signs (last day)     Date/Time Temp Temp src Pulse Resp BP Patient Position SpO2    12/15/22 1000 -- -- -- -- 145/61 -- --    12/15/22 0900 -- -- -- -- 134/55 -- --    12/15/22 0830 -- -- -- -- 82/63 -- --    12/15/22 0800 -- -- -- -- 131/61 -- --    12/15/22 0701 -- -- 61 -- 139/55 -- 93    12/15/22 0600 -- -- 67 -- 124/57 -- 94    12/15/22 0500 -- -- 71 -- 141/61 -- 94    12/15/22 0400 -- -- 71 16 129/50 -- 94    12/15/22 0300 98.2 (36.8) Oral 77 18 134/53 -- 94    12/15/22 0200 -- -- 84 -- 113/44 -- 92    12/15/22 0135 -- -- 89 22 117/45 Lying 92    12/15/22 0112 101.6 (38.7) Rectal 87 18 -- -- 94    12/15/22 0107 -- -- 89 18 -- -- 94    12/15/22 0016 -- -- 92 25 149/66 -- 93    12/14/22  2346 -- -- 89 -- 137/53 -- 93    12/14/22 2337 -- -- -- 20 -- -- --    12/14/22 2331 -- -- 86 -- 135/41 -- 94    12/14/22 2316 -- -- 85 -- 139/54 -- 95    12/14/22 2302 -- -- 86 -- -- -- 92    12/14/22 2301 -- -- 86 -- 141/48 -- 89    12/14/22 2231 -- -- 89 -- 141/57 -- --    12/14/22 2217 -- -- 88 -- 140/59 -- 94    12/14/22 2201 -- -- 92 -- 152/55 -- 91    12/14/22 2101 -- -- 88 -- 117/67 -- 91    12/14/22 2004 100.6 (38.1) -- 93 20 160/62 -- 88              Lab Results (last 24 hours)     Procedure Component Value Units Date/Time    Hemoglobin A1c [[id number]]  (Normal) Collected: 12/15/22 0611    Specimen: Blood Updated: 12/15/22 1024     Hemoglobin A1C 4.7 %     Narrative:      Hemoglobin A1C Reference Range:    <5.7 %        Normal  5.7-6.4 %     Increased risk for diabetes  > 6.4 %        Diabetes       These guidelines have been recommended by the American Diabetic Association for Hgb A1c.      The following 2010 guidelines have been recommended by the American Diabetes Association for Hemoglobin A1c.    HBA1c 5.7-6.4% Increased risk for future diabetes (pre-diabetes)  HBA1c     >6.4% Diabetes      Basic Metabolic Panel [[id number]]  (Abnormal) Collected: 12/15/22 0611    Specimen: Blood Updated: 12/15/22 0648     Glucose 155 mg/dL      BUN 16 mg/dL      Creatinine 0.97 mg/dL      Sodium 138 mmol/L      Potassium 5.0 mmol/L      Comment: Result checked          Chloride 105 mmol/L      CO2 25.0 mmol/L      Calcium 9.8 mg/dL      BUN/Creatinine Ratio 16.5     Anion Gap 8.0 mmol/L      eGFR 56.0 mL/min/1.73      Comment: National Kidney Foundation and American Society of Nephrology (ASN) Task Force recommended calculation based on the Chronic Kidney Disease Epidemiology Collaboration (CKD-EPI) equation refit without adjustment for race.       Narrative:      GFR Normal >60  Chronic Kidney Disease <60  Kidney Failure <15    The GFR formula is only valid for adults with stable renal function between ages 18 and  70.    Magnesium [[id number]]  (Normal) Collected: 12/15/22 0611    Specimen: Blood Updated: 12/15/22 0646     Magnesium 2.2 mg/dL     CBC & Differential [[id number]]  (Abnormal) Collected: 12/15/22 0611    Specimen: Blood Updated: 12/15/22 0624    Narrative:      The following orders were created for panel order CBC & Differential.  Procedure                               Abnormality         Status                     ---------                               -----------         ------                     CBC Auto Differential[[id number]]        Abnormal            Final result                 Please view results for these tests on the individual orders.    CBC Auto Differential [[id number]]  (Abnormal) Collected: 12/15/22 0611    Specimen: Blood Updated: 12/15/22 0624     WBC 14.40 10*3/mm3      RBC 3.98 10*6/mm3      Hemoglobin 12.0 g/dL      Hematocrit 35.6 %      MCV 89.5 fL      MCH 30.2 pg      MCHC 33.7 g/dL      RDW 12.9 %      RDW-SD 42.9 fl      MPV 7.3 fL      Platelets 292 10*3/mm3      Neutrophil % 90.6 %      Lymphocyte % 6.0 %      Monocyte % 3.2 %      Eosinophil % 0.0 %      Basophil % 0.2 %      Neutrophils, Absolute 13.00 10*3/mm3      Lymphocytes, Absolute 0.90 10*3/mm3      Monocytes, Absolute 0.50 10*3/mm3      Eosinophils, Absolute 0.00 10*3/mm3      Basophils, Absolute 0.00 10*3/mm3      nRBC 0.0 /100 WBC     Procalcitonin [[id number]]  (Normal) Collected: 12/14/22 2210    Specimen: Blood from Arm, Left Updated: 12/15/22 0017     Procalcitonin 0.10 ng/mL     Narrative:      As a Marker for Sepsis (Non-Neonates):    1. <0.5 ng/mL represents a low risk of severe sepsis and/or septic shock.  2. >2 ng/mL represents a high risk of severe sepsis and/or septic shock.    As a Marker for Lower Respiratory Tract Infections that require antibiotic therapy:    PCT on Admission    Antibiotic Therapy       6-12 Hrs later    >0.5                Strongly Recommended  >0.25 - <0.5        Recommended   0.1 - 0.25     "      Discouraged              Remeasure/reassess PCT  <0.1                Strongly Discouraged     Remeasure/reassess PCT    As 28 day mortality risk marker: \"Change in Procalcitonin Result\" (>80% or <=80%) if Day 0 (or Day 1) and Day 4 values are available. Refer to http://www.[facility]-pct-calculator.com    Change in PCT <=80%  A decrease of PCT levels below or equal to 80% defines a positive change in PCT test result representing a higher risk for 28-day all-cause mortality of patients diagnosed with severe sepsis for septic shock.    Change in PCT >80%  A decrease of PCT levels of more than 80% defines a negative change in PCT result representing a lower risk for 28-day all-cause mortality of patients diagnosed with severe sepsis or septic shock.       TSH [[id number]]  (Normal) Collected: 12/14/22 2210    Specimen: Blood from Arm, Left Updated: 12/15/22 0017     TSH 1.060 uIU/mL     C-reactive Protein [[id number]]  (Abnormal) Collected: 12/14/22 2210    Specimen: Blood from Arm, Left Updated: 12/15/22 0012     C-Reactive Protein 14.49 mg/dL     Lipid Panel [[id number]]  (Abnormal) Collected: 12/14/22 2210    Specimen: Blood from Arm, Left Updated: 12/15/22 0012     Total Cholesterol 203 mg/dL      Triglycerides 74 mg/dL      HDL Cholesterol 69 mg/dL      LDL Cholesterol  121 mg/dL      VLDL Cholesterol 13 mg/dL      LDL/HDL Ratio 1.73    Narrative:      Cholesterol Reference Ranges  (U.S. Department of Health and Human Services ATP III Classifications)    Desirable          <200 mg/dL  Borderline High    200-239 mg/dL  High Risk          >240 mg/dL      Triglyceride Reference Ranges  (U.S. Department of Health and Human Services ATP III Classifications)    Normal           <150 mg/dL  Borderline High  150-199 mg/dL  High             200-499 mg/dL  Very High        >500 mg/dL    HDL Reference Ranges  (U.S. Department of Health and Human Services ATP III Classifications)    Low     <40 mg/dl (major risk factor for " CHD)  High    >60 mg/dl ('negative' risk factor for CHD)        LDL Reference Ranges  (U.S. Department of Health and Human Services ATP III Classifications)    Optimal          <100 mg/dL  Near Optimal     100-129 mg/dL  Borderline High  130-159 mg/dL  High             160-189 mg/dL  Very High        >189 mg/dL    Magnesium [[id number]]  (Normal) Collected: 12/14/22 2210    Specimen: Blood from Arm, Left Updated: 12/15/22 0012     Magnesium 2.0 mg/dL     Basic Metabolic Panel [[id number]]  (Abnormal) Collected: 12/14/22 2210    Specimen: Blood from Arm, Left Updated: 12/14/22 2305     Glucose 106 mg/dL      BUN 15 mg/dL      Creatinine 0.93 mg/dL      Sodium 139 mmol/L      Potassium 3.9 mmol/L      Chloride 102 mmol/L      CO2 24.0 mmol/L      Calcium 9.6 mg/dL      BUN/Creatinine Ratio 16.1     Anion Gap 13.0 mmol/L      eGFR 58.9 mL/min/1.73      Comment: National Kidney Foundation and American Society of Nephrology (ASN) Task Force recommended calculation based on the Chronic Kidney Disease Epidemiology Collaboration (CKD-EPI) equation refit without adjustment for race.       Narrative:      GFR Normal >60  Chronic Kidney Disease <60  Kidney Failure <15    The GFR formula is only valid for adults with stable renal function between ages 18 and 70.    Respiratory Panel PCR w/COVID-19(SARS-CoV-2) NANDA/ALISHA/KOJO/PAD/COR/MAD/JUSTIN In-House, NP Swab in UTM/VTM, 3-4 HR TAT - Swab, Nasopharynx [[id number]]  (Normal) Collected: 12/14/22 2145    Specimen: Swab from Nasopharynx Updated: 12/14/22 2235     ADENOVIRUS, PCR Not Detected     Coronavirus 229E Not Detected     Coronavirus HKU1 Not Detected     Coronavirus NL63 Not Detected     Coronavirus OC43 Not Detected     COVID19 Not Detected     Human Metapneumovirus Not Detected     Human Rhinovirus/Enterovirus Not Detected     Influenza A PCR Not Detected     Influenza B PCR Not Detected     Parainfluenza Virus 1 Not Detected     Parainfluenza Virus 2 Not Detected      Parainfluenza Virus 3 Not Detected     Parainfluenza Virus 4 Not Detected     RSV, PCR Not Detected     Bordetella pertussis pcr Not Detected     Bordetella parapertussis PCR Not Detected     Chlamydophila pneumoniae PCR Not Detected     Mycoplasma pneumo by PCR Not Detected    Narrative:      In the setting of a positive respiratory panel with a viral infection PLUS a negative procalcitonin without other underlying concern for bacterial infection, consider observing off antibiotics or discontinuation of antibiotics and continue supportive care. If the respiratory panel is positive for atypical bacterial infection (Bordetella pertussis, Chlamydophila pneumoniae, or Mycoplasma pneumoniae), consider antibiotic de-escalation to target atypical bacterial infection.    CBC & Differential [[id number]]  (Abnormal) Collected: 12/14/22 2210    Specimen: Blood from Arm, Left Updated: 12/14/22 2221    Narrative:      The following orders were created for panel order CBC & Differential.  Procedure                               Abnormality         Status                     ---------                               -----------         ------                     CBC Auto Differential[[id number]]        Abnormal            Final result                 Please view results for these tests on the individual orders.    CBC Auto Differential [[id number]]  (Abnormal) Collected: 12/14/22 2210    Specimen: Blood from Arm, Left Updated: 12/14/22 2221     WBC 14.90 10*3/mm3      RBC 4.11 10*6/mm3      Hemoglobin 12.0 g/dL      Hematocrit 37.4 %      MCV 91.1 fL      MCH 29.1 pg      MCHC 32.0 g/dL      RDW 13.2 %      RDW-SD 41.6 fl      MPV 7.2 fL      Platelets 317 10*3/mm3      Neutrophil % 80.3 %      Lymphocyte % 10.5 %      Monocyte % 8.8 %      Eosinophil % 0.1 %      Basophil % 0.3 %      Neutrophils, Absolute 12.00 10*3/mm3      Lymphocytes, Absolute 1.60 10*3/mm3      Monocytes, Absolute 1.30 10*3/mm3      Eosinophils, Absolute 0.00  10*3/mm3      Basophils, Absolute 0.00 10*3/mm3      nRBC 0.0 /100 WBC     Blood Culture - Blood, Hand, Right [[id number]] Collected: 12/14/22 2210    Specimen: Blood from Hand, Right Updated: 12/14/22 2218    Blood Culture - Blood, Arm, Left [[id number]] Collected: 12/14/22 2210    Specimen: Blood from Arm, Left Updated: 12/14/22 2218    POC Lactate [[id number]]  (Abnormal) Collected: 12/14/22 2216    Specimen: Blood Updated: 12/14/22 2218     Lactate 0.4 mmol/L      Comment: Serial Number: [id number]:  [id number]       POC Lactate [[id number]]  (Abnormal) Collected: 12/14/22 2215    Specimen: Blood Updated: 12/14/22 2218     Lactate 0.3 mmol/L      Comment: Serial Number: [id number]:  [id number]           Imaging Results (Last 24 Hours)     Procedure Component Value Units Date/Time    CT Chest Without Contrast Diagnostic [[id number]] Collected: 12/15/22 1141     Updated: 12/15/22 1158    Narrative:         DATE OF EXAM:  12/15/2022 11:17 AM     PROCEDURE:  CT CHEST WO CONTRAST DIAGNOSTIC-     INDICATIONS:   Dyspnea, chronic, unclear etiology; J18.9-Pneumonia, unspecified  organism; R09.02-Hypoxemia     COMPARISON:   No Comparisons Available     TECHNIQUE:  Routine transaxial slices were obtained through the chest without the  administration of intravenous contrast. Reconstructed coronal and  sagittal images were also obtained. Automated exposure control and  iterative construction methods were used.     FINDINGS:  Delmy/mediastinum: Mildly enlarged paratracheal, AP window, and  subcarinal lymph nodes. Thoracic aorta normal in caliber. No definite  coronary calcification. No pericardial effusion     Lungs/pleura: Emphysema. Multiple clusters of tree-in-bud opacity  throughout the right lung, with some peripheral bronchial wall  thickening. Patchy airspace disease in the posterior left upper lobe and  lingula. No pleural effusion     Upper abdomen: Unremarkable     Bones/soft tissues no acute bony abnormality         Impression:      Multifocal bronchiolitis throughout the right lung. Airspace disease  compatible with pneumonia in the posterior left upper lobe/lingula.  Mildly enlarged mediastinal lymph nodes are likely reactive. Underlying  emphysema present     Electronically Signed By-Frankie Lagunas On:12/15/2022 11:56 AM  This report was finalized on [id number] by  Frankie Lagunas, .    XR Chest 1 View [[id number]] Collected: 12/14/22 2123     Updated: 12/14/22 2128    Narrative:         DATE OF EXAM:   12/14/2022 9:14 PM     PROCEDURE:   XR CHEST 1 VW-     INDICATIONS:   cough, shortness of breath     COMPARISON:  No Comparisons Available     TECHNIQUE:   [Portable chest radiograph]     FINDINGS:  There are patchy ill-defined infiltrates bilaterally with associated  interstitial changes. No pleural effusions are seen. The cardiac  silhouette and mediastinum are unremarkable. No acute osseous  abnormalities are observed.       Impression:      Patchy ill-defined infiltrates bilaterally with associated interstitial  changes. The findings suggest developing atypical/viral infection or  multifocal pneumonia. Covid 19 may be considered in the differential.  Recommend correlation for signs or symptoms of acute infection and  follow-up to ensure improvement/resolution.     Electronically Signed By-Seth De La Vega MD On:12/14/2022 9:26 PM  This report was finalized on [id number] by  Seth De La Vega MD.        ECG/EMG Results (last 24 hours)     Procedure Component Value Units Date/Time    ECG 12 Lead Dyspnea [[id number]] Collected: 12/14/22 2016     Updated: 12/14/22 2018     QT Interval 335 ms     Narrative:      HEART RATE= 89  bpm  RR Interval= 672  ms  MS Interval= 156  ms  P Horizontal Axis= 15  deg  P Front Axis= 63  deg  QRSD Interval= 94  ms  QT Interval= 335  ms  QRS Axis= -25  deg  T Wave Axis= 71  deg  - NORMAL ECG -  Sinus rhythm  Electronically Signed By:   Date and Time of Study: 2022-12-14 20:16:48           Operative/Procedure Notes (last 24 hours)  Notes from 12/14/22 1207 through 12/15/22 1207   No notes of this type exist for this encounter.         Physician Progress Notes (last 24 hours)  Notes from 12/14/22 1207 through 12/15/22 1207   No notes of this type exist for this encounter.            Consult Notes (last 24 hours)      Flor Romero MD at 12/15/22 0826      Consult Orders    1. Inpatient Pulmonology Consult [[id number]] ordered by Jorge Mcdaniel MD at 12/15/22 0804               [facility]         CONSULT NOTE    Patient Identification:  Kiki Dutta  89 y.o.  female  10/2/1933  [id number]            Requesting physician: Attending physician    Reason for Consultation: Acute respiratory failure with hypoxia    History of Present Illness:    Kiki Dutta is an 89-year-old female who presents to [facility] ED on 12/14/2022 with complaints of worsening shortness of breath and subjective fever that started 1 to 2 weeks ago.  Patient reports productive cough with yellow sputum, fatigue, and body aches.  She does have a family member she was in close contact with who was recently diagnosed with flu 1 week ago.  The patient denies using home oxygen.    Assessment:    Acute respiratory failure with hypoxia  Multifocal pneumonia    Systemic inflammatory response syndrome  Insomnia    Recommendations:    Chest CT without contrast    Strep pneumo antigen and Legionella    Titrate oxygen, currently requiring 3.5 L per NC    Antibiotic Rocephin and doxycycline  Solu-Medrol 40 mg daily  Mucinex twice daily        Review of Sytems:  Review of Systems   Respiratory: Positive for cough, shortness of breath and wheezing.        Past Medical History:  History reviewed. No pertinent past medical history.    Past Surgical History:  History reviewed. No pertinent surgical history.     Home Meds:  (Not in a hospital admission)      Allergies:  No Known Allergies    Social  "History:   Social History     Socioeconomic History   • Marital status:    Tobacco Use   • Smoking status: Former     Types: Cigarettes   Substance and Sexual Activity   • Alcohol use: Yes     Comment: rarely   • Drug use: Never   • Sexual activity: Defer       Family History:  History reviewed. No pertinent family history.    Physical Exam:  /55   Pulse 61   Temp 98.2 °F (36.8 °C) (Oral)   Resp 16   Ht 149.9 cm (59\")   Wt 62.9 kg (138 lb 10.7 oz)   SpO2 93%   BMI 28.01 kg/m²  Body mass index is 28.01 kg/m². 93% 62.9 kg (138 lb 10.7 oz)  Physical Exam  Vitals reviewed.   Cardiovascular:      Heart sounds: Murmur heard.   Pulmonary:      Effort: Pulmonary effort is normal.      Breath sounds: Wheezing and rhonchi present.   Skin:     General: Skin is warm and dry.   Neurological:      Mental Status: She is alert.         LABS:  Lab Results   Component Value Date    CALCIUM 9.8 12/15/2022     Results from last 7 days   Lab Units 12/15/22  0611 12/14/22  2210   MAGNESIUM mg/dL 2.2 2.0   SODIUM mmol/L 138 139   POTASSIUM mmol/L 5.0 3.9   CHLORIDE mmol/L 105 102   CO2 mmol/L 25.0 24.0   BUN mg/dL 16 15   CREATININE mg/dL 0.97 0.93   GLUCOSE mg/dL 155* 106*   CALCIUM mg/dL 9.8 9.6   WBC 10*3/mm3 14.40* 14.90*   HEMOGLOBIN g/dL 12.0 12.0   PLATELETS 10*3/mm3 292 317   PROCALCITONIN ng/mL  --  0.10     No results found for: CKTOTAL, CKMB, CKMBINDEX, TROPONINI, TROPONINT          Results from last 7 days   Lab Units 12/14/22  2216 12/14/22  2215 12/14/22  2210   PROCALCITONIN ng/mL  --   --  0.10   LACTATE mmol/L 0.4* 0.3*  --          Results from last 7 days   Lab Units 12/14/22  2145   ADENOVIRUS DETECTION BY PCR  Not Detected   CORONAVIRUS 229E  Not Detected   CORONAVIRUS HKU1  Not Detected   CORONAVIRUS NL63  Not Detected   CORONAVIRUS OC43  Not Detected   HUMAN METAPNEUMOVIRUS  Not Detected   HUMAN RHINOVIRUS/ENTEROVIRUS  Not Detected   INFLUENZA B PCR  Not Detected   PARAINFLUENZA 1  Not Detected "   PARAINFLUENZA VIRUS 2  Not Detected   PARAINFLUENZA VIRUS 3  Not Detected   PARAINFLUENZA VIRUS 4  Not Detected   BORDETELLA PERTUSSIS PCR  Not Detected   CHLAMYDOPHILA PNEUMONIAE PCR  Not Detected   MYCOPLAMA PNEUMO PCR  Not Detected   INFLUENZA A PCR  Not Detected   RSV, PCR  Not Detected             Lab Results   Component Value Date    TSH 1.060 12/14/2022     Estimated Creatinine Clearance: 33.8 mL/min (by C-G formula based on SCr of 0.97 mg/dL).         Imaging:  Imaging Results (Last 24 Hours)     Procedure Component Value Units Date/Time    XR Chest 1 View [[id number]] Collected: 12/14/22 2123     Updated: 12/14/22 2128    Narrative:         DATE OF EXAM:   12/14/2022 9:14 PM     PROCEDURE:   XR CHEST 1 VW-     INDICATIONS:   cough, shortness of breath     COMPARISON:  No Comparisons Available     TECHNIQUE:   [Portable chest radiograph]     FINDINGS:  There are patchy ill-defined infiltrates bilaterally with associated  interstitial changes. No pleural effusions are seen. The cardiac  silhouette and mediastinum are unremarkable. No acute osseous  abnormalities are observed.       Impression:      Patchy ill-defined infiltrates bilaterally with associated interstitial  changes. The findings suggest developing atypical/viral infection or  multifocal pneumonia. Covid 19 may be considered in the differential.  Recommend correlation for signs or symptoms of acute infection and  follow-up to ensure improvement/resolution.     Electronically Signed By-Seth De La Vega MD On:12/14/2022 9:26 PM  This report was finalized on [id number] by  Seth De La Vega MD.            Current Meds:   SCHEDULE  cefTRIAXone, 1 g, Intravenous, Q24H  doxycycline, 100 mg, Intravenous, Q12H  guaiFENesin, 600 mg, Oral, Q12H  methylPREDNISolone sodium succinate, 40 mg, Intravenous, Daily  sodium chloride, 10 mL, Intravenous, Q12H      Infusions     PRNs  •  acetaminophen **OR** acetaminophen **OR** acetaminophen  •  benzonatate  •   calcium carbonate  •  diazePAM  •  ipratropium-albuterol  •  magnesium sulfate **OR** magnesium sulfate in D5W 1g/100mL (PREMIX)  •  melatonin  •  nitroglycerin  •  ondansetron **OR** ondansetron  •  potassium chloride  •  potassium chloride  •  senna-docusate sodium  •  [COMPLETED] Insert Peripheral IV **AND** sodium chloride  •  sodium chloride  •  sodium chloride  •  traMADol        CAROL Monzon  12/15/2022  08:27 EST      Much of this encounter note is an electronic transcription/translation of spoken language to printed text using Dragon Software.    Electronically signed by Flor Romero MD at 12/15/22 7309

## 2022-12-15 NOTE — SIGNIFICANT NOTE
12/15/22 1555   OTHER   Discipline physical therapist   Rehab Time/Intention   Session Not Performed patient/family declined evaluation  (Spoke with pt and son; they state pt is independent with all mobility and is only here for breathing issues. They politely decline PT evaluation.)

## 2022-12-15 NOTE — ED PROVIDER NOTES
"Subjective   History of Present Illness  Chief complaint: Shortness of breath    89-year-old female presents with cough, shortness of breath, fever.  Cough is productive.  Patient states symptoms have been present for 1 to 2 weeks.  She has had family with similar symptoms.  She denies any vomiting or diarrhea.  She has had body aches but no specific chest pain.  She denies any alleviating or exacerbating factors.  Symptoms described as moderate in intensity.    History provided by:  Patient      Review of Systems   Constitutional: Positive for fever.   HENT: Positive for congestion.    Eyes: Negative for redness.   Respiratory: Positive for cough and shortness of breath.    Cardiovascular: Negative for chest pain.   Gastrointestinal: Negative for abdominal pain, diarrhea and vomiting.   Genitourinary: Negative for dysuria.   Musculoskeletal: Negative for back pain.   Skin: Negative for rash.   Neurological: Negative for headaches.   Psychiatric/Behavioral: Negative for confusion.       No past medical history on file.    No Known Allergies    No past surgical history on file.    No family history on file.    Social History     Socioeconomic History   • Marital status:        /41   Pulse 86   Temp (!) 100.6 °F (38.1 °C)   Resp 20   Ht 149.9 cm (59\")   Wt 62.9 kg (138 lb 10.7 oz)   SpO2 94%   BMI 28.01 kg/m²       Objective   Physical Exam  Vitals and nursing note reviewed.   Constitutional:       Appearance: She is well-developed.   HENT:      Head: Normocephalic and atraumatic.   Eyes:      Pupils: Pupils are equal, round, and reactive to light.   Cardiovascular:      Rate and Rhythm: Normal rate and regular rhythm.      Heart sounds: Normal heart sounds.   Pulmonary:      Effort: Pulmonary effort is normal. No respiratory distress.      Breath sounds: Decreased breath sounds and rhonchi present.      Comments: Frequent coughing  Abdominal:      General: Bowel sounds are normal.      Palpations: " Abdomen is soft.      Tenderness: There is no abdominal tenderness.   Musculoskeletal:         General: Normal range of motion.      Cervical back: Normal range of motion and neck supple.   Skin:     General: Skin is warm and dry.   Neurological:      General: No focal deficit present.      Mental Status: She is alert and oriented to person, place, and time.         Procedures           ED Course      My interpretation of EKG shows sinus rhythm, rate of 89, no ST elevation     Results for orders placed or performed during the hospital encounter of 12/14/22   Respiratory Panel PCR w/COVID-19(SARS-CoV-2) NANDA/ALISHA/KOJO/PAD/COR/MAD/JUSTIN In-House, NP Swab in UTM/VTM, 3-4 HR TAT - Swab, Nasopharynx    Specimen: Nasopharynx; Swab   Result Value Ref Range    ADENOVIRUS, PCR Not Detected Not Detected    Coronavirus 229E Not Detected Not Detected    Coronavirus HKU1 Not Detected Not Detected    Coronavirus NL63 Not Detected Not Detected    Coronavirus OC43 Not Detected Not Detected    COVID19 Not Detected Not Detected - Ref. Range    Human Metapneumovirus Not Detected Not Detected    Human Rhinovirus/Enterovirus Not Detected Not Detected    Influenza A PCR Not Detected Not Detected    Influenza B PCR Not Detected Not Detected    Parainfluenza Virus 1 Not Detected Not Detected    Parainfluenza Virus 2 Not Detected Not Detected    Parainfluenza Virus 3 Not Detected Not Detected    Parainfluenza Virus 4 Not Detected Not Detected    RSV, PCR Not Detected Not Detected    Bordetella pertussis pcr Not Detected Not Detected    Bordetella parapertussis PCR Not Detected Not Detected    Chlamydophila pneumoniae PCR Not Detected Not Detected    Mycoplasma pneumo by PCR Not Detected Not Detected   Basic Metabolic Panel    Specimen: Arm, Left; Blood   Result Value Ref Range    Glucose 106 (H) 65 - 99 mg/dL    BUN 15 8 - 23 mg/dL    Creatinine 0.93 0.57 - 1.00 mg/dL    Sodium 139 136 - 145 mmol/L    Potassium 3.9 3.5 - 5.2 mmol/L    Chloride  102 98 - 107 mmol/L    CO2 24.0 22.0 - 29.0 mmol/L    Calcium 9.6 8.6 - 10.5 mg/dL    BUN/Creatinine Ratio 16.1 7.0 - 25.0    Anion Gap 13.0 5.0 - 15.0 mmol/L    eGFR 58.9 (L) >60.0 mL/min/1.73   CBC Auto Differential    Specimen: Arm, Left; Blood   Result Value Ref Range    WBC 14.90 (H) 3.40 - 10.80 10*3/mm3    RBC 4.11 3.77 - 5.28 10*6/mm3    Hemoglobin 12.0 12.0 - 15.9 g/dL    Hematocrit 37.4 34.0 - 46.6 %    MCV 91.1 79.0 - 97.0 fL    MCH 29.1 26.6 - 33.0 pg    MCHC 32.0 31.5 - 35.7 g/dL    RDW 13.2 12.3 - 15.4 %    RDW-SD 41.6 37.0 - 54.0 fl    MPV 7.2 6.0 - 12.0 fL    Platelets 317 140 - 450 10*3/mm3    Neutrophil % 80.3 (H) 42.7 - 76.0 %    Lymphocyte % 10.5 (L) 19.6 - 45.3 %    Monocyte % 8.8 5.0 - 12.0 %    Eosinophil % 0.1 (L) 0.3 - 6.2 %    Basophil % 0.3 0.0 - 1.5 %    Neutrophils, Absolute 12.00 (H) 1.70 - 7.00 10*3/mm3    Lymphocytes, Absolute 1.60 0.70 - 3.10 10*3/mm3    Monocytes, Absolute 1.30 (H) 0.10 - 0.90 10*3/mm3    Eosinophils, Absolute 0.00 0.00 - 0.40 10*3/mm3    Basophils, Absolute 0.00 0.00 - 0.20 10*3/mm3    nRBC 0.0 0.0 - 0.2 /100 WBC   POC Lactate    Specimen: Blood   Result Value Ref Range    Lactate 0.3 (L) 0.5 - 2.0 mmol/L   POC Lactate    Specimen: Blood   Result Value Ref Range    Lactate 0.4 (L) 0.5 - 2.0 mmol/L   ECG 12 Lead Dyspnea   Result Value Ref Range    QT Interval 335 ms     XR Chest 1 View    Result Date: 12/14/2022  Patchy ill-defined infiltrates bilaterally with associated interstitial changes. The findings suggest developing atypical/viral infection or multifocal pneumonia. Covid 19 may be considered in the differential. Recommend correlation for signs or symptoms of acute infection and follow-up to ensure improvement/resolution.  Electronically Signed By-Seth De La Vega MD On:12/14/2022 9:26 PM This report was finalized on [id number] by  Seth De La Vega MD.                                    MDM   Patient had the above evaluation.  Results were discussed with the  patient.  Patient was mildly hypoxic on arrival at around 88% on room air.  She is placed on nasal cannula.  White blood cell count was 14.9.  Lactic acid is normal.  Respiratory panel is negative.  BMP is unremarkable.  Chest x-ray is showing multifocal pneumonia.  She was started on IV antibiotics.  Blood cultures were obtained.  I discussed with the nurse practitioner on-call for the hospitalist and the patient will be admitted for further evaluation and management.      Final diagnoses:   Multifocal pneumonia   Hypoxia       ED Disposition  ED Disposition     ED Disposition   Decision to Admit    Condition   --    Comment   Level of Care: Telemetry [5]   Diagnosis: Multifocal pneumonia [[id number]]   Admitting Physician: ROSELYN MUNOZ [[id number]]   Attending Physician: ROSELYN MUNOZ [[id number]]   Certification: I Certify That Inpatient Hospital Services Are Medically Necessary For Greater Than 2 Midnights               No follow-up provider specified.       Medication List      No changes were made to your prescriptions during this visit.          Rickie Galloway MD  12/15/22 0000

## 2022-12-15 NOTE — CASE MANAGEMENT/SOCIAL WORK
Case Management/Social Work    Patient Name:  Kiki Dutta  YOB: 1933  MRN: [id number]  Admit Date:  12/14/2022    Order for oxygen, patient has not preference for DME Co  Referral given to [facility], portable delivered to bedside.  Son instructed to praful Prajap[facility] upon dc to have concentrator delivered.    Met with patient in room wearing PPE: mask, face shield/goggles, gloves, gown.      Maintained distance greater than six feet and spent less than 15 minutes in the room.        Electronically signed by:  Corin Akers RN  12/15/22 16:52 EST

## 2022-12-15 NOTE — NURSING NOTE
Patient discharged with 1L oxygen. Patient signed and understood discharge paperwork. She opted for calling Dr. Edward clinic next week if she does not feel better vs me scheduling an appointment for her d/t insurance reasons. I provided her with the phone number. IV removed and she was wheeled out to her sons car.

## 2022-12-15 NOTE — DISCHARGE SUMMARY
[facility] Medicine Services  DISCHARGE SUMMARY    Patient Name: Kiki Dutta  : 10/2/1933  MRN: [id number]    Date of Admission: 2022  Discharge Diagnosis: Acute respiratory failure with hypoxia/multifocal pneumonia.  Date of Discharge: 12/15/2022  Primary Care Physician: Provider, No Known      Presenting Problem:   Multifocal pneumonia [J18.9]    Active and Resolved Hospital Problems:  Active Hospital Problems    Diagnosis POA   • **Multifocal pneumonia [J18.9] Yes     Priority: High   • Acute respiratory failure with hypoxia (HCC) [J96.01] Yes     Priority: High   • SIRS (systemic inflammatory response syndrome) (HCC) [R65.10] Yes     Priority: Low   • Insomnia [G47.00] Yes      Resolved Hospital Problems   No resolved problems to display.         Hospital Course   From previous notes and with minor updates.    Hospital Course:      Patient is an 89 y.o. female with past medical history of a lung condition, chronic pain, insomnia who presented to [facility] on 2022 complaining of shortness of breath, fever that started 1 to 2 weeks ago.  Patient also complains of productive cough of yellow sputum, body aches, fatigue, generalized weakness.  She denies nausea, vomiting, diarrhea, chest pain, chills.  A family member she has been in contact with had similar symptoms and was diagnosed with the flu approximately 1 week ago.  Patient stated she has home inhalers for a lung condition, but she is unsure of the diagnosis.  Patient does not wear supplemental oxygen at home.  She is currently on 3 L per NC.  Patient was seen in the emergency room and in the ED, chest x-ray showed patchy ill-defined infiltrates bilaterally with associated interstitial changes, suggestive of developing atypical/viral infection or multifocal pneumonia, COVID-19 may be considered in the differential.  EKG showed sinus rhythm.  All labs unremarkable except WBC 14.9.  All vital  signs unremarkable except O2 sat 88% on room air, temp 100.6.  Patient received Rocephin, doxycycline in the ED.  Patient admitted to hospitalist service for further evaluation and treatment.  Multifocal pneumonia was treated with antibiotics.  Acute respiratory failure with hypoxia was treated with oxygen therapy.  Patient was discharged with oxygen.  Appropriate patient's home medications were resumed in the hospital for other chronic medical conditions.  Hypertension was treated with Norvasc.  Patient and family reported significant improvement in patient's symptoms and requested the patient be discharged home to follow-up with the pulmonary clinic and the primary care physician as an outpatient.  Patient was advised to take her medications as prescribed.  The discharge medications are as per medication reconciliation list.  Patient was advised to follow-up with her primary care physician within 3 to 5 days of discharge.  Patient was advised to follow-up with her pulmonologist within 7 days of discharge.  Patient was advised to return to the emergency department if she experiences any recurrence of her symptoms.  Patient and family agreed with the plan and patient was discharged in stable condition.      DISCHARGE Follow Up Recommendations for labs and diagnostics:     Patient was advised to follow-up with her primary care physician who will review her current medications.    Patient was advised to follow-up with her pulmonologist who will reassess her pulmonary function.      Reasons For Change In Medications and Indications for New Medications:      Day of Discharge     Vital Signs:  Temp:  [97.9 °F (36.6 °C)-101.6 °F (38.7 °C)] 98.6 °F (37 °C)  Heart Rate:  [61-93] 63  Resp:  [12-25] 14  BP: ()/(41-67) 118/47  Flow (L/min):  [1-3.5] 1    Physical Exam:  Physical Exam  Vitals reviewed.   Constitutional:       General: She is not in acute distress.  HENT:      Head: Normocephalic and atraumatic.      Nose:  Nose normal. No congestion or rhinorrhea.      Mouth/Throat:      Mouth: Mucous membranes are moist.      Pharynx: Oropharynx is clear. No oropharyngeal exudate or posterior oropharyngeal erythema.   Eyes:      Pupils: Pupils are equal, round, and reactive to light.   Cardiovascular:      Rate and Rhythm: Normal rate and regular rhythm.      Pulses: Normal pulses.      Heart sounds: Normal heart sounds. No murmur heard.    No friction rub. No gallop.   Pulmonary:      Effort: No respiratory distress.      Breath sounds: No wheezing, rhonchi or rales.   Chest:      Chest wall: No tenderness.   Abdominal:      General: Bowel sounds are normal. There is no distension.      Palpations: Abdomen is soft. There is no mass.      Tenderness: There is no abdominal tenderness. There is no right CVA tenderness, left CVA tenderness, guarding or rebound.      Hernia: No hernia is present.   Musculoskeletal:         General: No swelling, tenderness, deformity or signs of injury.      Cervical back: Neck supple. No tenderness.      Right lower leg: No edema.      Left lower leg: No edema.   Skin:     Capillary Refill: Capillary refill takes less than 2 seconds.      Coloration: Skin is not jaundiced.      Findings: No bruising, lesion or rash.   Neurological:      Mental Status: She is alert.      Comments: No facial asymmetry noted.  Gait and station not tested.   Psychiatric:      Comments: No agitation.              Pertinent  and/or Most Recent Results     LAB RESULTS:      Lab 12/15/22  0611 12/14/22  2216 12/14/22  2215 12/14/22  2210   WBC 14.40*  --   --  14.90*   HEMOGLOBIN 12.0  --   --  12.0   HEMATOCRIT 35.6  --   --  37.4   PLATELETS 292  --   --  317   NEUTROS ABS 13.00*  --   --  12.00*   LYMPHS ABS 0.90  --   --  1.60   MONOS ABS 0.50  --   --  1.30*   EOS ABS 0.00  --   --  0.00   MCV 89.5  --   --  91.1   CRP  --   --   --  14.49*   PROCALCITONIN  --   --   --  0.10   LACTATE  --  0.4* 0.3*  --          Lab  12/15/22  0611 12/14/22  2210   SODIUM 138 139   POTASSIUM 5.0 3.9   CHLORIDE 105 102   CO2 25.0 24.0   ANION GAP 8.0 13.0   BUN 16 15   CREATININE 0.97 0.93   EGFR 56.0* 58.9*   GLUCOSE 155* 106*   CALCIUM 9.8 9.6   MAGNESIUM 2.2 2.0   HEMOGLOBIN A1C 4.7  --    TSH  --  1.060                 Lab 12/14/22  2210   CHOLESTEROL 203*   LDL CHOL 121*   HDL CHOL 69*   TRIGLYCERIDES 74             Brief Urine Lab Results     None        Microbiology Results (last 10 days)     Procedure Component Value - Date/Time    Respiratory Panel PCR w/COVID-19(SARS-CoV-2) NANDA/ALISHA/KOJO/PAD/COR/MAD/JUSTIN In-House, NP Swab in UTM/VTM, 3-4 HR TAT - Swab, Nasopharynx [[id number]]  (Normal) Collected: 12/14/22 2145    Lab Status: Final result Specimen: Swab from Nasopharynx Updated: 12/14/22 2235     ADENOVIRUS, PCR Not Detected     Coronavirus 229E Not Detected     Coronavirus HKU1 Not Detected     Coronavirus NL63 Not Detected     Coronavirus OC43 Not Detected     COVID19 Not Detected     Human Metapneumovirus Not Detected     Human Rhinovirus/Enterovirus Not Detected     Influenza A PCR Not Detected     Influenza B PCR Not Detected     Parainfluenza Virus 1 Not Detected     Parainfluenza Virus 2 Not Detected     Parainfluenza Virus 3 Not Detected     Parainfluenza Virus 4 Not Detected     RSV, PCR Not Detected     Bordetella pertussis pcr Not Detected     Bordetella parapertussis PCR Not Detected     Chlamydophila pneumoniae PCR Not Detected     Mycoplasma pneumo by PCR Not Detected    Narrative:      In the setting of a positive respiratory panel with a viral infection PLUS a negative procalcitonin without other underlying concern for bacterial infection, consider observing off antibiotics or discontinuation of antibiotics and continue supportive care. If the respiratory panel is positive for atypical bacterial infection (Bordetella pertussis, Chlamydophila pneumoniae, or Mycoplasma pneumoniae), consider antibiotic de-escalation to target  atypical bacterial infection.          CT Chest Without Contrast Diagnostic    Result Date: 12/15/2022  Impression: Multifocal bronchiolitis throughout the right lung. Airspace disease compatible with pneumonia in the posterior left upper lobe/lingula. Mildly enlarged mediastinal lymph nodes are likely reactive. Underlying emphysema present  Electronically Signed By-Frankie Lagunas On:12/15/2022 11:56 AM This report was finalized on [id number] by  Frankie Lagunas, .    XR Chest 1 View    Result Date: 12/14/2022  Impression: Patchy ill-defined infiltrates bilaterally with associated interstitial changes. The findings suggest developing atypical/viral infection or multifocal pneumonia. Covid 19 may be considered in the differential. Recommend correlation for signs or symptoms of acute infection and follow-up to ensure improvement/resolution.  Electronically Signed By-Seth De La Vega MD On:12/14/2022 9:26 PM This report was finalized on [id number] by  Seth De La Vega MD.                  Labs Pending at Discharge:  Pending Labs     Order Current Status    Blood Culture - Blood, Arm, Left In process    Blood Culture - Blood, Hand, Right In process          Procedures Performed           Consults:   Consults     Date and Time Order Name Status Description    12/15/2022  8:04 AM Inpatient Pulmonology Consult Completed     12/14/2022 11:16 PM Hospitalist (on-call MD unless specified)              Discharge Details        Discharge Medications      New Medications      Instructions Start Date   albuterol sulfate  (90 Base) MCG/ACT inhaler  Commonly known as: PROVENTIL HFA;VENTOLIN HFA;PROAIR HFA   2 puffs, Inhalation, Every 4 Hours PRN      benzonatate 100 MG capsule  Commonly known as: TESSALON   100 mg, Oral, 3 Times Daily PRN      cefdinir 300 MG capsule  Commonly known as: OMNICEF   300 mg, Oral, Every 12 Hours Scheduled      doxycycline 100 MG tablet  Commonly known as: ADOXA   100 mg, Oral, Every 12 Hours  Scheduled      guaiFENesin 600 MG 12 hr tablet  Commonly known as: MUCINEX   600 mg, Oral, Every 12 Hours Scheduled      predniSONE 10 MG tablet  Commonly known as: DELTASONE   30 mg, Oral, Daily   Start Date: December 16, 2022     predniSONE 20 MG tablet  Commonly known as: DELTASONE   20 mg, Oral, Daily   Start Date: December 18, 2022     predniSONE 10 MG tablet  Commonly known as: DELTASONE   Take 3 tablets by mouth once daily x 2 days, 2 tablets once daily x 2 days,  1 tablet by mouth Daily x 2 days   Start Date: December 20, 2022        Continue These Medications      Instructions Start Date   amLODIPine 5 MG tablet  Commonly known as: NORVASC   5 mg, Oral, Daily      ipratropium 17 MCG/ACT inhaler  Commonly known as: ATROVENT HFA   2 puffs, Inhalation, 4 Times Daily - RT      NON FORMULARY   7.5 mg, Oral, Nightly PRN      NON FORMULARY   2 each, Every 4 Hours PRN, SALBUMATOL - [facility]      NON FORMULARY   2 each, Every Morning, INSPIOLTO - [place] [facility]      pantoprazole 40 MG EC tablet  Commonly known as: PROTONIX   40 mg, Oral, Daily      traMADol-acetaminophen 37.5-325 MG per tablet  Commonly known as: ULTRACET   1 tablet, Oral, 3 Times Daily PRN      valsartan 80 MG tablet  Commonly known as: DIOVAN   40 mg, Oral, Daily             No Known Allergies      Discharge Disposition: Stable.  Home or Self Care    Diet:  Hospital:  Diet Order   Procedures   • Diet: Cardiac Diets; Healthy Heart (2-3 Na+); Texture: Regular Texture (IDDSI 7); Fluid Consistency: Thin (IDDSI 0)         Discharge Activity: As tolerated.        CODE STATUS:  Code Status and Medical Interventions:   Ordered at: 12/15/22 0100     Level Of Support Discussed With:    Patient     Code Status (Patient has no pulse and is not breathing):    No CPR (Do Not Attempt to Resuscitate)     Medical Interventions (Patient has pulse or is breathing):    Full Support         No future appointments.        Time spent on Discharge including face to face  service:55 minutes    This patient has been examined wearing appropriate Personal Protective Equipment and discussed with hospital infection control department, [facility] department, infectious disease specialist and pulmonologist. 12/15/22      Signature: Electronically signed by Jorge Mcdaniel MD, FACP, 12/15/22, 4:00 PM EST.

## 2022-12-15 NOTE — H&P
[facility] Medicine Services  History & Physical    Patient Name: Kiki Dutta  : 10/2/1933  MRN: [id number]  Primary Care Physician:  Nazanin, No Known  Date of admission: 2022  Date and Time of Service: 12/15/2022 at 0006    Subjective      Chief Complaint: Shortness of breath    History of Present Illness: Kiki Dutta is a 89 y.o. female with past medical history of a lung condition, chronic pain, insomnia who presented to [facility] on 2022 complaining of shortness of breath, fever that started 1 to 2 weeks ago.  Patient also complains of productive cough of yellow sputum, body aches, fatigue, generalized weakness.  She denies nausea, vomiting, diarrhea, chest pain, chills.  A family member she has been in contact with had similar symptoms and was diagnosed with the flu approximately 1 week ago.  Patient stated she has home inhalers for a lung condition, but she is unsure of the diagnosis.  Patient does not wear supplemental oxygen at home.  She is currently on 3 L per NC.    In the ED, chest x-ray showed patchy ill-defined infiltrates bilaterally with associated interstitial changes, suggestive of developing atypical/viral infection or multifocal pneumonia, COVID-19 may be considered in the differential.  EKG showed sinus rhythm.  All labs unremarkable except WBC 14.9.  All vital signs unremarkable except O2 sat 88% on room air, temp 100.6.  Patient received Rocephin, doxycycline in the ED.  Patient admitted to hospitalist service for further evaluation and treatment.    Review of Systems   Constitutional: Positive for fever and malaise/fatigue. Negative for chills.   HENT: Negative.    Eyes: Negative.    Cardiovascular: Positive for dyspnea on exertion. Negative for chest pain.   Respiratory: Positive for cough and shortness of breath.    Endocrine: Negative.    Skin: Negative.    Musculoskeletal: Positive for myalgias.   Gastrointestinal: Negative.   Negative for nausea.   Genitourinary: Negative.    Neurological: Positive for weakness.   Psychiatric/Behavioral: Negative.    Allergic/Immunologic: Negative.         Personal History     History reviewed. No pertinent past medical history.    History reviewed. No pertinent surgical history.    Family History: family history is not on file. Otherwise pertinent FHx was reviewed and not pertinent to current issue.    Social History:  reports that she has quit smoking. Her smoking use included cigarettes. She does not have any smokeless tobacco history on file. She reports current alcohol use. She reports that she does not use drugs.    Home Medications:  Prior to Admission Medications     None            Allergies:  No Known Allergies    Objective      Vitals:   Temp:  [100.6 °F (38.1 °C)] 100.6 °F (38.1 °C)  Heart Rate:  [85-93] 87  Resp:  [18-25] 18  BP: (117-160)/(41-67) 149/66    Physical Exam  Vitals and nursing note reviewed.   Constitutional:       Appearance: Normal appearance.   HENT:      Head: Normocephalic.      Right Ear: External ear normal.      Left Ear: External ear normal.      Nose: Nose normal.      Mouth/Throat:      Pharynx: Oropharynx is clear.   Eyes:      Extraocular Movements: Extraocular movements intact.   Cardiovascular:      Rate and Rhythm: Normal rate and regular rhythm.      Pulses: Normal pulses.      Heart sounds: Normal heart sounds.   Pulmonary:      Comments: Accessory muscle use noted, lung sounds diminished throughout  Abdominal:      General: Bowel sounds are normal.      Palpations: Abdomen is soft.   Musculoskeletal:         General: Normal range of motion.      Cervical back: Normal range of motion.   Skin:     General: Skin is warm and dry.   Neurological:      Mental Status: She is alert and oriented to person, place, and time.   Psychiatric:         Mood and Affect: Mood normal.         Behavior: Behavior normal.         Result Review    Result Review:  I have personally  reviewed the results from the time of this admission to 12/15/2022 01:16 EST and agree with these findings:  [x]  Laboratory  [x]  Microbiology  [x]  Radiology  [x]  EKG/Telemetry   []  Cardiology/Vascular   []  Pathology  []  Old records  []  Other:  Most notable findings include: as above      Assessment & Plan        Active Hospital Problems:  Active Hospital Problems    Diagnosis    • **Multifocal pneumonia    • Insomnia    • SIRS (systemic inflammatory response syndrome) ([facility])      Plan:     Multifocal pneumonia  SIRS (systemic inflammatory response syndrome)   -chest x-ray showed patchy ill-defined infiltrates bilaterally with associated interstitial changes, suggestive of developing atypical/viral infection or multifocal pneumonia, COVID-19 may be considered in the differential  -EKG showed sinus rhythm  -Respiratory panel negative  -WBC 14.9  -Lactate WNL  -Temperature 100.6  -O2 sat 88% on room air  -Currently on 3 L per NC supplemental oxygen  -Wean off supplemental oxygen  -Rocephin and doxycycline given in the ED, continue  -DuoNeb, methylprednisolone, Mucinex, Tessalon ordered    Insomnia     DVT prophylaxis:  Mechanical DVT prophylaxis orders are present.    CODE STATUS:    Level Of Support Discussed With: Patient  Code Status (Patient has no pulse and is not breathing): No CPR (Do Not Attempt to Resuscitate)  Medical Interventions (Patient has pulse or is breathing): Full Support    Admission Status:  I believe this patient meets inpatient status.    I discussed the patient's findings and my recommendations with patient and family.    This patient has been examined wearing appropriate Personal Protective Equipment. 12/15/22      Signature: Electronically signed by CAROL Roy, 12/15/22, 01:16 EST.  [facility] Hospitalist Team

## 2022-12-15 NOTE — CASE MANAGEMENT/SOCIAL WORK
Discharge Planning Assessment   [place]     Patient Name: Kiki Dutta  MRN: [id number]  Today's Date: 12/15/2022    Admit Date: 12/14/2022    Plan: Plan to return home with arielal Ryan   Discharge Needs Assessment     Row Name 12/15/22 1156       Living Environment    People in Home child(lolita), adult    Name(s) of People in Home Lives with ariella Ryan currently    Current Living Arrangements home    Primary Care Provided by self    Provides Primary Care For no one    Family Caregiver if Needed child(lolita), adult    Quality of Family Relationships helpful;supportive    Able to Return to Prior Arrangements yes       Resource/Environmental Concerns    Transportation Concerns none       Transition Planning    Patient/Family Anticipates Transition to home with family    Transportation Anticipated family or friend will provide  [place]       Discharge Needs Assessment    Readmission Within the Last 30 Days no previous admission in last 30 days    Equipment Currently Used at Home none    Concerns to be Addressed discharge planning               Discharge Plan     Row Name 12/15/22 1664       Plan    Plan Plan to return home with ariella Ryan    Plan Comments Spoke with pateint at bedside, who live in [place], here visting with ariella Ryan. Confimed PCP Reece Subramanian,[phone], Pharmacy [facility]  [phone]. Does not use DME. Plans to return home with son who is able to transport.              Demographic Summary     Row Name 12/15/22 1153       General Information    Admission Type inpatient    Arrived From emergency department    Referral Source emergency department    Reason for Consult discharge planning    Preferred Language English               Functional Status     Row Name 12/15/22 1156       Functional Status    Usual Activity Tolerance good    Current Activity Tolerance good       Functional Status, IADL    Medications independent    Meal Preparation independent    Housekeeping independent     Shopping independent       Mental Status    General Appearance WDL WDL            Met with patient in room wearing PPE: mask, face shield/goggles, gloves, gown.      Maintained distance greater than six feet and spent less than 15 minutes in the room.        Corin Akers RN

## 2022-12-15 NOTE — CONSULTS
"Group: [facility]         CONSULT NOTE    Patient Identification:  Kiki Dutta  89 y.o.  female  10/2/1933  [id number]            Requesting physician: Attending physician    Reason for Consultation: Acute respiratory failure with hypoxia    History of Present Illness:    Kiki Dutta is an 89-year-old female who presents to [facility], [place], operated by [facility] ED on 12/14/2022 with complaints of worsening shortness of breath and subjective fever that started 1 to 2 weeks ago.  Patient reports productive cough with yellow sputum, fatigue, and body aches.  She does have a family member she was in close contact with who was recently diagnosed with flu 1 week ago.  The patient denies using home oxygen.    Assessment:    Acute respiratory failure with hypoxia  Multifocal pneumonia    Systemic inflammatory response syndrome  Insomnia    Recommendations:    Chest CT without contrast    Strep pneumo antigen and Legionella    Titrate oxygen, currently requiring 3.5 L per NC    Antibiotic Rocephin and doxycycline  Solu-Medrol 40 mg daily  Mucinex twice daily        Review of Sytems:  Review of Systems   Respiratory: Positive for cough, shortness of breath and wheezing.        Past Medical History:  History reviewed. No pertinent past medical history.    Past Surgical History:  History reviewed. No pertinent surgical history.     Home Meds:  (Not in a hospital admission)      Allergies:  No Known Allergies    Social History:   Social History     Socioeconomic History   • Marital status:    Tobacco Use   • Smoking status: Former     Types: Cigarettes   Substance and Sexual Activity   • Alcohol use: Yes     Comment: rarely   • Drug use: Never   • Sexual activity: Defer       Family History:  History reviewed. No pertinent family history.    Physical Exam:  /55   Pulse 61   Temp 98.2 °F (36.8 °C) (Oral)   Resp 16   Ht 149.9 cm (59\")   Wt 62.9 kg (138 lb 10.7 oz)   SpO2 93%   BMI 28.01 kg/m²  " Body mass index is 28.01 kg/m². 93% 62.9 kg (138 lb 10.7 oz)  Physical Exam  Vitals reviewed.   Cardiovascular:      Heart sounds: Murmur heard.   Pulmonary:      Effort: Pulmonary effort is normal.      Breath sounds: Wheezing and rhonchi present.   Skin:     General: Skin is warm and dry.   Neurological:      Mental Status: She is alert.         LABS:  Lab Results   Component Value Date    CALCIUM 9.8 12/15/2022     Results from last 7 days   Lab Units 12/15/22  0611 12/14/22  2210   MAGNESIUM mg/dL 2.2 2.0   SODIUM mmol/L 138 139   POTASSIUM mmol/L 5.0 3.9   CHLORIDE mmol/L 105 102   CO2 mmol/L 25.0 24.0   BUN mg/dL 16 15   CREATININE mg/dL 0.97 0.93   GLUCOSE mg/dL 155* 106*   CALCIUM mg/dL 9.8 9.6   WBC 10*3/mm3 14.40* 14.90*   HEMOGLOBIN g/dL 12.0 12.0   PLATELETS 10*3/mm3 292 317   PROCALCITONIN ng/mL  --  0.10     No results found for: CKTOTAL, CKMB, CKMBINDEX, TROPONINI, TROPONINT          Results from last 7 days   Lab Units 12/14/22  2216 12/14/22  2215 12/14/22  2210   PROCALCITONIN ng/mL  --   --  0.10   LACTATE mmol/L 0.4* 0.3*  --          Results from last 7 days   Lab Units 12/14/22  2145   ADENOVIRUS DETECTION BY PCR  Not Detected   CORONAVIRUS 229E  Not Detected   CORONAVIRUS HKU1  Not Detected   CORONAVIRUS NL63  Not Detected   CORONAVIRUS OC43  Not Detected   HUMAN METAPNEUMOVIRUS  Not Detected   HUMAN RHINOVIRUS/ENTEROVIRUS  Not Detected   INFLUENZA B PCR  Not Detected   PARAINFLUENZA 1  Not Detected   PARAINFLUENZA VIRUS 2  Not Detected   PARAINFLUENZA VIRUS 3  Not Detected   PARAINFLUENZA VIRUS 4  Not Detected   BORDETELLA PERTUSSIS PCR  Not Detected   CHLAMYDOPHILA PNEUMONIAE PCR  Not Detected   MYCOPLAMA PNEUMO PCR  Not Detected   INFLUENZA A PCR  Not Detected   RSV, PCR  Not Detected             Lab Results   Component Value Date    TSH 1.060 12/14/2022     Estimated Creatinine Clearance: 33.8 mL/min (by C-G formula based on SCr of 0.97 mg/dL).         Imaging:  Imaging Results (Last 24  Hours)     Procedure Component Value Units Date/Time    XR Chest 1 View [[id number]] Collected: 12/14/22 2123     Updated: 12/14/22 2128    Narrative:         DATE OF EXAM:   12/14/2022 9:14 PM     PROCEDURE:   XR CHEST 1 VW-     INDICATIONS:   cough, shortness of breath     COMPARISON:  No Comparisons Available     TECHNIQUE:   [Portable chest radiograph]     FINDINGS:  There are patchy ill-defined infiltrates bilaterally with associated  interstitial changes. No pleural effusions are seen. The cardiac  silhouette and mediastinum are unremarkable. No acute osseous  abnormalities are observed.       Impression:      Patchy ill-defined infiltrates bilaterally with associated interstitial  changes. The findings suggest developing atypical/viral infection or  multifocal pneumonia. Covid 19 may be considered in the differential.  Recommend correlation for signs or symptoms of acute infection and  follow-up to ensure improvement/resolution.     Electronically Signed By-Seth De La Vega MD On:12/14/2022 9:26 PM  This report was finalized on [id number] by  Seth De La Vega MD.            Current Meds:   SCHEDULE  cefTRIAXone, 1 g, Intravenous, Q24H  doxycycline, 100 mg, Intravenous, Q12H  guaiFENesin, 600 mg, Oral, Q12H  methylPREDNISolone sodium succinate, 40 mg, Intravenous, Daily  sodium chloride, 10 mL, Intravenous, Q12H      Infusions     PRNs  •  acetaminophen **OR** acetaminophen **OR** acetaminophen  •  benzonatate  •  calcium carbonate  •  diazePAM  •  ipratropium-albuterol  •  magnesium sulfate **OR** magnesium sulfate in D5W 1g/100mL (PREMIX)  •  melatonin  •  nitroglycerin  •  ondansetron **OR** ondansetron  •  potassium chloride  •  potassium chloride  •  senna-docusate sodium  •  [COMPLETED] Insert Peripheral IV **AND** sodium chloride  •  sodium chloride  •  sodium chloride  •  traMADol        CAROL Monzon  12/15/2022  08:27 EST      Much of this encounter note is an electronic transcription/translation  of spoken language to printed text using Dragon Software.

## 2022-12-16 ENCOUNTER — READMISSION MANAGEMENT (OUTPATIENT)
Dept: CALL CENTER | Facility: HOSPITAL | Age: 87
End: 2022-12-16

## 2022-12-16 LAB — QT INTERVAL: 335 MS

## 2022-12-16 NOTE — OUTREACH NOTE
Prep Survey    Flowsheet Row Responses   [facility] facility patient discharged from? [facility]   Is LACE score < 7 ? Yes   Eligibility Not Eligible   What are the reasons patient is not eligible? Other   Does the patient have one of the following disease processes/diagnoses(primary or secondary)? Other   Prep survey completed? Yes          BRITT URBINA - Registered Nurse

## 2022-12-16 NOTE — PAYOR COMM NOTE
"CLAIM # [id number]    DISCHARGE NOTICE --    This patient discharged HOME on 12/15/22.      Please note that this patient's status was changed to Observation on 12/15 prior to discharge.     Please advise if additional information is needed to finalize this request.    Thank you!      Melonie Hanna  Utilization Review Coordinator  [address]  [address]  Ph: [phone]  Fx: [phone]       [phone]    Kiki Mathews (89 y.o. Female)     Date of Birth   10/02/1933    Social Security Number       Address   [address] [place] [address] [address] [place] [address]    Home Phone   [phone]    MRN   [id number]       Mu-ism   Gnosticism    Marital Status                               Admission Date   12/14/22    Admission Type   Emergency    Admitting Provider   Kizzy Wong MD    Attending Provider       Department, Room/Bed   [facility] EMERGENCY DEPARTMENT, [facility]       Discharge Date   12/15/2022    Discharge Disposition   Home or Self Care    Discharge Destination                               Attending Provider: (none)   Allergies: No Known Allergies    Isolation: None   Infection: None   Code Status: Prior    Ht: 149.9 cm (59\")   Wt: 62.9 kg (138 lb 10.7 oz)    Admission Cmt: None   Principal Problem: Multifocal pneumonia [J18.9]                 Active Insurance as of 12/14/2022     Primary Coverage     Payor Plan Insurance Group Employer/Plan Group    MISC COMMERCIAL MISC COMMERCIAL [id number]     Coverage Address Coverage Phone Number Coverage Fax Number Effective Dates    [address] ELSY  [phone]  10/1/2022 - None Entered    [facility] [id number]       Subscriber Name Subscriber Birth Date Member ID       KIKI MATHEWS [birth year] [id number]                 Emergency Contacts      (Rel.) Home Phone Work Phone Mobile Phone    CYNTHIA MATHEWS (Son) [phone] -- [phone]               Discharge Summary      Jorge Mcdaniel MD " at 12/15/22 1550                       [facility] Medicine Services  DISCHARGE SUMMARY    Patient Name: Kiki Dutta  : 10/2/1933  MRN: [id number]    Date of Admission: 2022  Discharge Diagnosis: Acute respiratory failure with hypoxia/multifocal pneumonia.  Date of Discharge: 12/15/2022  Primary Care Physician: Provider, No Known    Presenting Problem:   Multifocal pneumonia [J18.9]    Active and Resolved Hospital Problems:  Active Hospital Problems    Diagnosis POA   • **Multifocal pneumonia [J18.9] Yes     Priority: High   • Acute respiratory failure with hypoxia (HCC) [J96.01] Yes     Priority: High   • SIRS (systemic inflammatory response syndrome) (HCC) [R65.10] Yes     Priority: Low   • Insomnia [G47.00] Yes      Resolved Hospital Problems   No resolved problems to display.         Hospital Course   From previous notes and with minor updates.    Hospital Course:      Patient is an 89 y.o. female with past medical history of a lung condition, chronic pain, insomnia who presented to [facility] on 2022 complaining of shortness of breath, fever that started 1 to 2 weeks ago.  Patient also complains of productive cough of yellow sputum, body aches, fatigue, generalized weakness.  She denies nausea, vomiting, diarrhea, chest pain, chills.  A family member she has been in contact with had similar symptoms and was diagnosed with the flu approximately 1 week ago.  Patient stated she has home inhalers for a lung condition, but she is unsure of the diagnosis.  Patient does not wear supplemental oxygen at home.  She is currently on 3 L per NC.  Patient was seen in the emergency room and in the ED, chest x-ray showed patchy ill-defined infiltrates bilaterally with associated interstitial changes, suggestive of developing atypical/viral infection or multifocal pneumonia, COVID-19 may be considered in the differential.  EKG showed sinus rhythm.  All labs unremarkable except  WBC 14.9.  All vital signs unremarkable except O2 sat 88% on room air, temp 100.6.  Patient received Rocephin, doxycycline in the ED.  Patient admitted to hospitalist service for further evaluation and treatment.  Multifocal pneumonia was treated with antibiotics.  Acute respiratory failure with hypoxia was treated with oxygen therapy.  Patient was discharged with oxygen.  Appropriate patient's home medications were resumed in the hospital for other chronic medical conditions.  Hypertension was treated with Norvasc.  Patient and family reported significant improvement in patient's symptoms and requested the patient be discharged home to follow-up with the pulmonary clinic and the primary care physician as an outpatient.  Patient was advised to take her medications as prescribed.  The discharge medications are as per medication reconciliation list.  Patient was advised to follow-up with her primary care physician within 3 to 5 days of discharge.  Patient was advised to follow-up with her pulmonologist within 7 days of discharge.  Patient was advised to return to the emergency department if she experiences any recurrence of her symptoms.  Patient and family agreed with the plan and patient was discharged in stable condition.      DISCHARGE Follow Up Recommendations for labs and diagnostics:     Patient was advised to follow-up with her primary care physician who will review her current medications.    Patient was advised to follow-up with her pulmonologist who will reassess her pulmonary function.      Reasons For Change In Medications and Indications for New Medications:      Day of Discharge     Vital Signs:  Temp:  [97.9 °F (36.6 °C)-101.6 °F (38.7 °C)] 98.6 °F (37 °C)  Heart Rate:  [61-93] 63  Resp:  [12-25] 14  BP: ()/(41-67) 118/47  Flow (L/min):  [1-3.5] 1    Physical Exam:  Physical Exam  Vitals reviewed.   Constitutional:       General: She is not in acute distress.  HENT:      Head: Normocephalic and  atraumatic.      Nose: Nose normal. No congestion or rhinorrhea.      Mouth/Throat:      Mouth: Mucous membranes are moist.      Pharynx: Oropharynx is clear. No oropharyngeal exudate or posterior oropharyngeal erythema.   Eyes:      Pupils: Pupils are equal, round, and reactive to light.   Cardiovascular:      Rate and Rhythm: Normal rate and regular rhythm.      Pulses: Normal pulses.      Heart sounds: Normal heart sounds. No murmur heard.    No friction rub. No gallop.   Pulmonary:      Effort: No respiratory distress.      Breath sounds: No wheezing, rhonchi or rales.   Chest:      Chest wall: No tenderness.   Abdominal:      General: Bowel sounds are normal. There is no distension.      Palpations: Abdomen is soft. There is no mass.      Tenderness: There is no abdominal tenderness. There is no right CVA tenderness, left CVA tenderness, guarding or rebound.      Hernia: No hernia is present.   Musculoskeletal:         General: No swelling, tenderness, deformity or signs of injury.      Cervical back: Neck supple. No tenderness.      Right lower leg: No edema.      Left lower leg: No edema.   Skin:     Capillary Refill: Capillary refill takes less than 2 seconds.      Coloration: Skin is not jaundiced.      Findings: No bruising, lesion or rash.   Neurological:      Mental Status: She is alert.      Comments: No facial asymmetry noted.  Gait and station not tested.   Psychiatric:      Comments: No agitation.       Pertinent  and/or Most Recent Results     LAB RESULTS:      Lab 12/15/22  0611 12/14/22  2216 12/14/22  2215 12/14/22  2210   WBC 14.40*  --   --  14.90*   HEMOGLOBIN 12.0  --   --  12.0   HEMATOCRIT 35.6  --   --  37.4   PLATELETS 292  --   --  317   NEUTROS ABS 13.00*  --   --  12.00*   LYMPHS ABS 0.90  --   --  1.60   MONOS ABS 0.50  --   --  1.30*   EOS ABS 0.00  --   --  0.00   MCV 89.5  --   --  91.1   CRP  --   --   --  14.49*   PROCALCITONIN  --   --   --  0.10   LACTATE  --  0.4* 0.3*  --           Lab 12/15/22  0611 12/14/22  2210   SODIUM 138 139   POTASSIUM 5.0 3.9   CHLORIDE 105 102   CO2 25.0 24.0   ANION GAP 8.0 13.0   BUN 16 15   CREATININE 0.97 0.93   EGFR 56.0* 58.9*   GLUCOSE 155* 106*   CALCIUM 9.8 9.6   MAGNESIUM 2.2 2.0   HEMOGLOBIN A1C 4.7  --    TSH  --  1.060                 Lab 12/14/22  2210   CHOLESTEROL 203*   LDL CHOL 121*   HDL CHOL 69*   TRIGLYCERIDES 74             Brief Urine Lab Results     None        Microbiology Results (last 10 days)     Procedure Component Value - Date/Time    Respiratory Panel PCR w/COVID-19(SARS-CoV-2) NANDA/ALISHA/KOJO/PAD/COR/MAD/JUSTIN In-House, NP Swab in UTM/VTM, 3-4 HR TAT - Swab, Nasopharynx [[id number]]  (Normal) Collected: 12/14/22 2145    Lab Status: Final result Specimen: Swab from Nasopharynx Updated: 12/14/22 2235     ADENOVIRUS, PCR Not Detected     Coronavirus 229E Not Detected     Coronavirus HKU1 Not Detected     Coronavirus NL63 Not Detected     Coronavirus OC43 Not Detected     COVID19 Not Detected     Human Metapneumovirus Not Detected     Human Rhinovirus/Enterovirus Not Detected     Influenza A PCR Not Detected     Influenza B PCR Not Detected     Parainfluenza Virus 1 Not Detected     Parainfluenza Virus 2 Not Detected     Parainfluenza Virus 3 Not Detected     Parainfluenza Virus 4 Not Detected     RSV, PCR Not Detected     Bordetella pertussis pcr Not Detected     Bordetella parapertussis PCR Not Detected     Chlamydophila pneumoniae PCR Not Detected     Mycoplasma pneumo by PCR Not Detected    Narrative:      In the setting of a positive respiratory panel with a viral infection PLUS a negative procalcitonin without other underlying concern for bacterial infection, consider observing off antibiotics or discontinuation of antibiotics and continue supportive care. If the respiratory panel is positive for atypical bacterial infection (Bordetella pertussis, Chlamydophila pneumoniae, or Mycoplasma pneumoniae), consider antibiotic de-escalation  to target atypical bacterial infection.          CT Chest Without Contrast Diagnostic    Result Date: 12/15/2022  Impression: Multifocal bronchiolitis throughout the right lung. Airspace disease compatible with pneumonia in the posterior left upper lobe/lingula. Mildly enlarged mediastinal lymph nodes are likely reactive. Underlying emphysema present  Electronically Signed By-Frankie Lagunas On:12/15/2022 11:56 AM This report was finalized on [id number] by  Frankie Lagunas, .    XR Chest 1 View    Result Date: 12/14/2022  Impression: Patchy ill-defined infiltrates bilaterally with associated interstitial changes. The findings suggest developing atypical/viral infection or multifocal pneumonia. Covid 19 may be considered in the differential. Recommend correlation for signs or symptoms of acute infection and follow-up to ensure improvement/resolution.  Electronically Signed By-Seth De La Vega MD On:12/14/2022 9:26 PM This report was finalized on [id number] by  Seth De La Vega MD.      Labs Pending at Discharge:  Pending Labs     Order Current Status    Blood Culture - Blood, Arm, Left In process    Blood Culture - Blood, Hand, Right In process          Procedures Performed     Consults:   Consults     Date and Time Order Name Status Description    12/15/2022  8:04 AM Inpatient Pulmonology Consult Completed     12/14/2022 11:16 PM Hospitalist (on-call MD unless specified)            Discharge Details        Discharge Medications      New Medications      Instructions Start Date   albuterol sulfate  (90 Base) MCG/ACT inhaler  Commonly known as: PROVENTIL HFA;VENTOLIN HFA;PROAIR HFA   2 puffs, Inhalation, Every 4 Hours PRN      benzonatate 100 MG capsule  Commonly known as: TESSALON   100 mg, Oral, 3 Times Daily PRN      cefdinir 300 MG capsule  Commonly known as: OMNICEF   300 mg, Oral, Every 12 Hours Scheduled      doxycycline 100 MG tablet  Commonly known as: ADOXA   100 mg, Oral, Every 12 Hours Scheduled       guaiFENesin 600 MG 12 hr tablet  Commonly known as: MUCINEX   600 mg, Oral, Every 12 Hours Scheduled      predniSONE 10 MG tablet  Commonly known as: DELTASONE   30 mg, Oral, Daily   Start Date: December 16, 2022     predniSONE 20 MG tablet  Commonly known as: DELTASONE   20 mg, Oral, Daily   Start Date: December 18, 2022     predniSONE 10 MG tablet  Commonly known as: DELTASONE   Take 3 tablets by mouth once daily x 2 days, 2 tablets once daily x 2 days,  1 tablet by mouth Daily x 2 days   Start Date: December 20, 2022        Continue These Medications      Instructions Start Date   amLODIPine 5 MG tablet  Commonly known as: NORVASC   5 mg, Oral, Daily      ipratropium 17 MCG/ACT inhaler  Commonly known as: ATROVENT HFA   2 puffs, Inhalation, 4 Times Daily - RT      NON FORMULARY   7.5 mg, Oral, Nightly PRN      NON FORMULARY   2 each, Every 4 Hours PRN, SALBUMATOL - [facility]      NON FORMULARY   2 each, Every Morning, INSPIOLTO - [facility]      pantoprazole 40 MG EC tablet  Commonly known as: PROTONIX   40 mg, Oral, Daily      traMADol-acetaminophen 37.5-325 MG per tablet  Commonly known as: ULTRACET   1 tablet, Oral, 3 Times Daily PRN      valsartan 80 MG tablet  Commonly known as: DIOVAN   40 mg, Oral, Daily             No Known Allergies      Discharge Disposition: Stable.  Home or Self Care    Diet:  Hospital:  Diet Order   Procedures   • Diet: Cardiac Diets; Healthy Heart (2-3 Na+); Texture: Regular Texture (IDDSI 7); Fluid Consistency: Thin (IDDSI 0)       Discharge Activity: As tolerated.    CODE STATUS:  Code Status and Medical Interventions:   Ordered at: 12/15/22 0100     Level Of Support Discussed With:    Patient     Code Status (Patient has no pulse and is not breathing):    No CPR (Do Not Attempt to Resuscitate)     Medical Interventions (Patient has pulse or is breathing):    Full Support     No future appointments.      Time spent on Discharge including face to face service:55 minutes    This  patient has been examined wearing appropriate Personal Protective Equipment and discussed with hospital infection control department, [facility], infectious disease specialist and pulmonologist. 12/15/22      Signature: Electronically signed by Jorge Mcdaniel MD, FACP, 12/15/22, 4:00 PM EST.    Electronically signed by Jorge Mcdaniel MD at 12/15/22 1600

## 2022-12-19 LAB
BACTERIA SPEC AEROBE CULT: NORMAL
BACTERIA SPEC AEROBE CULT: NORMAL